# Patient Record
Sex: FEMALE | Race: WHITE | Employment: UNEMPLOYED | URBAN - METROPOLITAN AREA
[De-identification: names, ages, dates, MRNs, and addresses within clinical notes are randomized per-mention and may not be internally consistent; named-entity substitution may affect disease eponyms.]

---

## 2018-05-15 ENCOUNTER — ED HISTORICAL/CONVERTED ENCOUNTER (OUTPATIENT)
Dept: OTHER | Age: 18
End: 2018-05-15

## 2020-04-28 ENCOUNTER — ED HISTORICAL/CONVERTED ENCOUNTER (OUTPATIENT)
Dept: OTHER | Age: 20
End: 2020-04-28

## 2020-05-08 LAB
AFPT, MATERNAL, EXTERNAL: NEGATIVE
ANTIBODY SCREEN, EXTERNAL: NEGATIVE
CHLAMYDIA, EXTERNAL: NEGATIVE
CYSTIC FIBROSIS, EXTERNAL: NORMAL
HBSAG, EXTERNAL: NEGATIVE
HCT, EXTERNAL: 33.7
HEPATITIS C AB,   EXT: NEGATIVE
HGB, EXTERNAL: 10.9
HIV, EXTERNAL: POSITIVE
N. GONORRHEA, EXTERNAL: NEGATIVE
PAP SMEAR, EXTERNAL: NORMAL
PLATELET CNT,   EXTERNAL: 349
RPR, EXTERNAL: NORMAL
RUBELLA, EXTERNAL: NORMAL
TYPE, ABO & RH, EXTERNAL: NORMAL
URINALYSIS, EXTERNAL: NEGATIVE

## 2020-06-10 ENCOUNTER — ED HISTORICAL/CONVERTED ENCOUNTER (OUTPATIENT)
Dept: OTHER | Age: 20
End: 2020-06-10

## 2020-06-21 ENCOUNTER — ED HISTORICAL/CONVERTED ENCOUNTER (OUTPATIENT)
Dept: OTHER | Age: 20
End: 2020-06-21

## 2020-08-04 ENCOUNTER — ROUTINE PRENATAL (OUTPATIENT)
Dept: OBGYN CLINIC | Age: 20
End: 2020-08-04
Payer: MEDICAID

## 2020-08-04 VITALS
HEIGHT: 70 IN | WEIGHT: 269 LBS | BODY MASS INDEX: 38.51 KG/M2 | HEART RATE: 112 BPM | DIASTOLIC BLOOD PRESSURE: 80 MMHG | SYSTOLIC BLOOD PRESSURE: 120 MMHG

## 2020-08-04 DIAGNOSIS — Z3A.20 20 WEEKS GESTATION OF PREGNANCY: Primary | ICD-10-CM

## 2020-08-04 DIAGNOSIS — B20 HIV INFECTION, UNSPECIFIED SYMPTOM STATUS (HCC): ICD-10-CM

## 2020-08-04 DIAGNOSIS — O09.92 HIGH-RISK PREGNANCY IN SECOND TRIMESTER: ICD-10-CM

## 2020-08-04 DIAGNOSIS — Q51.3 BICORNUATE UTERUS: ICD-10-CM

## 2020-08-04 DIAGNOSIS — D64.9 ANEMIA, UNSPECIFIED TYPE: ICD-10-CM

## 2020-08-04 DIAGNOSIS — Z98.891 HISTORY OF CESAREAN DELIVERY: ICD-10-CM

## 2020-08-04 PROCEDURE — 0502F SUBSEQUENT PRENATAL CARE: CPT | Performed by: OBSTETRICS & GYNECOLOGY

## 2020-08-04 RX ORDER — PROMETHAZINE HYDROCHLORIDE 25 MG/1
TABLET ORAL
COMMUNITY
Start: 2020-05-08 | End: 2020-12-05

## 2020-08-04 RX ORDER — LANOLIN ALCOHOL/MO/W.PET/CERES
CREAM (GRAM) TOPICAL
COMMUNITY
Start: 2020-06-17 | End: 2020-12-05

## 2020-08-04 NOTE — PROGRESS NOTES
Appt with ID this month where they will discuss HAART. Following with MFM. No issues. +FM. Denies LOF/VB/CTXs.

## 2020-08-04 NOTE — PROGRESS NOTES
05/08/2020 7 wks 4 days aakinsanya1 u/s Present Other (see comments) 0cm / 0% / -3 112/68 sitting 263.4 lbs With clothes none none negative 3+ neg Negative 3+ 6.5 1.005  Comments: New Ob visit. See note. Ob labs. Given [id number] flier to inquire abt coverage. Threatened miscarriage due to VB.    05/15/2020 8 wks 4 days aakinsanya1 140/78 sitting   124/78 sitting 261.4 lbs With clothes   Comments: Acute visit. See note. Discussed HIV pos results    06/09/2020 12 wks 1 days aakinsanya1 160 by u/s Present Yes Other (see comments) 0cm / 0% / -3 143/71 sitting   143/75 sitting 269.6 lbs With clothes none none negative neg neg Negative trace 5.5 1.03  Comments: Elevated BP w/o dx of HTN. Will monitor. If elevated at next visit, will dx with [facility]. +FM. Denies LOF/VB. Has lower abd cramping with heavy lifting/omvement. Staying hydrated. SVE closed. Urine dip neg for UTI. Took Metronidazole for Trichomonas treatment. She has had intercourse since treatment but states it was protected. Her partner has been treated. Trich BENJA today. Has ID appt for HIV next week. There were no earlier appts available. MFM referral placed. MatT21 covered by insurance. 07/06/2020 16 wks aakinsanya1 Breech 141 by u/s Present Yes Other (see comments) 133/74 sitting 271.6 lbs With clothes none none negative trace neg Negative trace 6.0 1.015  Comments: She saw Dr Guillermo Connolyl on 7/2/2020. She has to get labs done and they will then prescribe her HAART. +FM. Denies LOF/VB/CTXs. Has next MFM appt 7/23/2020. She was treated for a UTI in the hospital in June. Urine dip neg for UTI in office. AFP to be ordered when she returns to clinic as lab is now closed. Needs CMV/Toxo labs if they were not ordered by ID.

## 2020-08-10 PROBLEM — Z98.891 HISTORY OF CESAREAN DELIVERY: Status: ACTIVE | Noted: 2020-08-10

## 2020-08-10 PROBLEM — D64.9 ANEMIA: Status: ACTIVE | Noted: 2020-08-10

## 2020-08-10 PROBLEM — Q51.3 BICORNUATE UTERUS: Status: ACTIVE | Noted: 2020-08-10

## 2020-08-20 ENCOUNTER — TELEPHONE (OUTPATIENT)
Dept: OBGYN CLINIC | Age: 20
End: 2020-08-20

## 2020-08-20 NOTE — TELEPHONE ENCOUNTER
Ambrosio Bey I have not responded. I tried calling your number, but calling restrictions will not let call go through to you. Can you please give me a call back at [phone] if you are still having symptoms. Otherwise, we will see you at your next  appointment.

## 2020-08-20 NOTE — TELEPHONE ENCOUNTER
----- Message from Jan Stern sent at 8/14/2020  2:22 PM EDT -----  Regarding: FW: Prescription Question  Contact: [phone]    ----- Message -----  From: Krystal Thompson  Sent: 8/13/2020   6:43 PM EDT  To: Itzel Markham Nurse  Subject: Prescription Question                            Im showing slight signs of uti. I feel discomfort throughout the night as well as when i urinate. Can you prescribe me an antibiotic? I would set an appointment but i currently dont have a  for my 1yo .

## 2020-09-08 ENCOUNTER — ROUTINE PRENATAL (OUTPATIENT)
Dept: OBGYN CLINIC | Age: 20
End: 2020-09-08
Payer: MEDICAID

## 2020-09-08 VITALS
SYSTOLIC BLOOD PRESSURE: 131 MMHG | BODY MASS INDEX: 39.8 KG/M2 | DIASTOLIC BLOOD PRESSURE: 71 MMHG | HEIGHT: 70 IN | WEIGHT: 278 LBS | HEART RATE: 91 BPM

## 2020-09-08 DIAGNOSIS — Z3A.25 25 WEEKS GESTATION OF PREGNANCY: ICD-10-CM

## 2020-09-08 DIAGNOSIS — O09.92 HIGH-RISK PREGNANCY IN SECOND TRIMESTER: ICD-10-CM

## 2020-09-08 DIAGNOSIS — B20 HIV INFECTION, UNSPECIFIED SYMPTOM STATUS (HCC): Primary | ICD-10-CM

## 2020-09-08 LAB
BILIRUB UR QL STRIP: NEGATIVE
GLUCOSE UR-MCNC: NEGATIVE MG/DL
KETONES P FAST UR STRIP-MCNC: NEGATIVE MG/DL
PH UR STRIP: 6 [PH] (ref 4.6–8)
PROT UR QL STRIP: NEGATIVE
SP GR UR STRIP: 1.02 (ref 1–1.03)
UA UROBILINOGEN AMB POC: NORMAL (ref 0.2–1)
URINALYSIS CLARITY POC: NORMAL
URINALYSIS COLOR POC: NORMAL
URINE BLOOD POC: NEGATIVE
URINE LEUKOCYTES POC: NORMAL
URINE NITRITES POC: NEGATIVE

## 2020-09-08 PROCEDURE — 81003 URINALYSIS AUTO W/O SCOPE: CPT | Performed by: OBSTETRICS & GYNECOLOGY

## 2020-09-08 PROCEDURE — 0502F SUBSEQUENT PRENATAL CARE: CPT | Performed by: OBSTETRICS & GYNECOLOGY
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
## 2020-09-09 LAB
BASOPHILS # BLD AUTO: 0 X10E3/UL (ref 0–0.2)
BASOPHILS NFR BLD AUTO: 0 %
EOSINOPHIL # BLD AUTO: 0.1 X10E3/UL (ref 0–0.4)
EOSINOPHIL NFR BLD AUTO: 2 %
ERYTHROCYTE [DISTWIDTH] IN BLOOD BY AUTOMATED COUNT: 14.5 % (ref 11.7–15.4)
GLUCOSE 1H P 50 G GLC PO SERPL-MCNC: 124 MG/DL (ref 65–139)
HCT VFR BLD AUTO: 30.1 % (ref 34–46.6)
HGB BLD-MCNC: 9.9 G/DL (ref 11.1–15.9)
IMM GRANULOCYTES # BLD AUTO: 0.1 X10E3/UL (ref 0–0.1)
IMM GRANULOCYTES NFR BLD AUTO: 1 %
LYMPHOCYTES # BLD AUTO: 1.6 X10E3/UL (ref 0.7–3.1)
LYMPHOCYTES NFR BLD AUTO: 20 %
MCH RBC QN AUTO: 26.7 PG (ref 26.6–33)
MCHC RBC AUTO-ENTMCNC: 32.9 G/DL (ref 31.5–35.7)
MCV RBC AUTO: 81 FL (ref 79–97)
MONOCYTES # BLD AUTO: 0.5 X10E3/UL (ref 0.1–0.9)
MONOCYTES NFR BLD AUTO: 6 %
NEUTROPHILS # BLD AUTO: 5.9 X10E3/UL (ref 1.4–7)
NEUTROPHILS NFR BLD AUTO: 71 %
PLATELET # BLD AUTO: 293 X10E3/UL (ref 150–450)
RBC # BLD AUTO: 3.71 X10E6/UL (ref 3.77–5.28)
WBC # BLD AUTO: 8.2 X10E3/UL (ref 3.4–10.8)

## 2020-09-10 LAB
BASOPHILS # BLD AUTO: 0 X10E3/UL (ref 0–0.2)
BASOPHILS NFR BLD AUTO: 0 %
CD3+CD4+ CELLS # BLD: 583 /UL (ref 359–1519)
CD3+CD4+ CELLS NFR BLD: 32.4 % (ref 30.8–58.5)
CMV IGG SERPL IA-ACNC: 2.4 U/ML (ref 0–0.59)
CMV IGM SERPL IA-ACNC: <30 AU/ML (ref 0–29.9)
EOSINOPHIL # BLD AUTO: 0.1 X10E3/UL (ref 0–0.4)
EOSINOPHIL NFR BLD AUTO: 2 %
ERYTHROCYTE [DISTWIDTH] IN BLOOD BY AUTOMATED COUNT: 14.9 % (ref 11.7–15.4)
GAMMA INTERFERON BACKGROUND BLD IA-ACNC: 0.02 IU/ML
HCT VFR BLD AUTO: 31.4 % (ref 34–46.6)
HGB BLD-MCNC: 10.2 G/DL (ref 11.1–15.9)
HIV GENOSURE, 551656: NORMAL
HIV RT+PR MUT DET ISLT: NORMAL
HIV1 RNA # SERPL NAA+PROBE: NORMAL COPIES/ML
HIV1 RNA SERPL NAA+PROBE-LOG#: 4.2 LOG10COPY/ML
HLA-B*57:01 SPEC QL: NEGATIVE
IMM GRANULOCYTES # BLD AUTO: 0.1 X10E3/UL (ref 0–0.1)
IMM GRANULOCYTES NFR BLD AUTO: 1 %
LYMPHOCYTES # BLD AUTO: 1.8 X10E3/UL (ref 0.7–3.1)
LYMPHOCYTES NFR BLD AUTO: 23 %
M TB IFN-G BLD-IMP: NEGATIVE
M TB IFN-G CD4+ BCKGRND COR BLD-ACNC: 0.02 IU/ML
MCH RBC QN AUTO: 26.4 PG (ref 26.6–33)
MCHC RBC AUTO-ENTMCNC: 32.5 G/DL (ref 31.5–35.7)
MCV RBC AUTO: 81 FL (ref 79–97)
MITOGEN IGNF BLD-ACNC: 0.58 IU/ML
MONOCYTES # BLD AUTO: 0.6 X10E3/UL (ref 0.1–0.9)
MONOCYTES NFR BLD AUTO: 7 %
NEUTROPHILS # BLD AUTO: 5.4 X10E3/UL (ref 1.4–7)
NEUTROPHILS NFR BLD AUTO: 67 %
PLATELET # BLD AUTO: 292 X10E3/UL (ref 150–450)
QUANTIFERON INCUBATION, QF1T: NORMAL
QUANTIFERON TB2 AG: 0.02 IU/ML
RBC # BLD AUTO: 3.86 X10E6/UL (ref 3.77–5.28)
SERVICE CMNT-IMP: NORMAL
WBC # BLD AUTO: 8 X10E3/UL (ref 3.4–10.8)

## 2020-09-15 ENCOUNTER — OFFICE VISIT (OUTPATIENT)
Dept: INFECTIOUS DISEASES | Age: 20
End: 2020-09-15
Payer: MEDICAID

## 2020-09-15 VITALS
RESPIRATION RATE: 16 BRPM | BODY MASS INDEX: 39.94 KG/M2 | DIASTOLIC BLOOD PRESSURE: 78 MMHG | SYSTOLIC BLOOD PRESSURE: 140 MMHG | TEMPERATURE: 98.3 F | OXYGEN SATURATION: 99 % | WEIGHT: 279 LBS | HEIGHT: 70 IN | HEART RATE: 104 BPM

## 2020-09-15 DIAGNOSIS — Z3A.25 25 WEEKS GESTATION OF PREGNANCY: ICD-10-CM

## 2020-09-15 DIAGNOSIS — Z21 ASYMPTOMATIC HIV INFECTION (HCC): Primary | ICD-10-CM

## 2020-09-15 PROCEDURE — 99214 OFFICE O/P EST MOD 30 MIN: CPT | Performed by: INTERNAL MEDICINE

## 2020-09-15 RX ORDER — PRENATAL VIT 96/IRON FUM/FOLIC 27MG-0.8MG
TABLET ORAL
COMMUNITY
Start: 2020-08-06

## 2020-09-15 RX ORDER — EMTRICITABINE AND TENOFOVIR ALAFENAMIDE 200; 25 MG/1; MG/1
1 TABLET ORAL DAILY
Qty: 30 TAB | Refills: 3 | Status: SHIPPED | OUTPATIENT
Start: 2020-09-15 | End: 2020-12-05

## 2020-09-15 NOTE — PROGRESS NOTES
Subjective  Abebe Cheeks    HPI  22 y.o WF presenting for a f/u of HIV infection diagnosed while pregnant. She was last seen in 07/2020 during which lab work was ordered but not done due to logistic issues per pt. She finally  had blood work done on 08/31/20 which showed a baseline T cell count of 583 (32.4%) and 15,700 copies/ml. Genosure did not show any evidence of transmitted resistance and she tested neg for HLA 5701B. Her CMV IgG was positive but she doesnt exhibit any symptoms of CMV disease. She is to 25 wks of gestation and her pregnancy has been progressing w/o complications. She denies acute complaints on assessment today and is interested in being started on HIV meds ASAP. ROS  Review of Systems   Constitutional: Negative. Respiratory: Negative. Cardiovascular: Negative. Gastrointestinal: Negative. Genitourinary: Negative. Musculoskeletal: Negative. Skin: Negative. Objective  Physical Exam  Constitutional:       Appearance: Normal appearance. Cardiovascular:      Rate and Rhythm: Normal rate and regular rhythm. Pulmonary:      Effort: Pulmonary effort is normal.      Breath sounds: Normal breath sounds. Abdominal:      General: Abdomen is flat. Palpations: Abdomen is soft. Comments: Obese, distended, due to pregnancy    Musculoskeletal: Normal range of motion. Neurological:      Mental Status: She is alert. Assessment & Plan  Diagnoses and all orders for this visit:    1. [facility]  -     emtricitabine-tenofovir alafen (Descovy) tablet; Take 1 Tab by mouth daily. -     raltegravir (ISENTRESS) 400 mg tablet; Take 1 Tab by mouth two (2) times a day.   -     HIV-1 RNA QT BY PCR; Future  -     LYMPHOCYTES, CD4 PERCENT AND ABSOLUTE; Future  - Initiate Descovy and isentress for treatment of HIV infection, will repeat HIV RNA and T cell count after 4 wk of tx  - Pt was counseled on compliance     2. 25 weeks gestation of pregnancy       - Being closely followed by Ob/Gyn

## 2020-09-16 ENCOUNTER — TELEPHONE (OUTPATIENT)
Dept: INFECTIOUS DISEASES | Age: 20
End: 2020-09-16

## 2020-09-16 NOTE — TELEPHONE ENCOUNTER
Phone call to [facility] re: [employer]apter[employer]. Spoke with Rashmi. I did not see the results in [employer] but she found the results and she will fax them to us.

## 2020-09-17 ENCOUNTER — TELEPHONE (OUTPATIENT)
Dept: INFECTIOUS DISEASES | Age: 20
End: 2020-09-17

## 2020-09-17 NOTE — TELEPHONE ENCOUNTER
Phone call from the patient stating that the new HIV medication Descovey is making her very ill. She is very nauseated and her stomach is hurting. She wants Dr Doris Hall to be aware that she may have to switch medications.

## 2020-09-30 ENCOUNTER — ROUTINE PRENATAL (OUTPATIENT)
Dept: OBGYN CLINIC | Age: 20
End: 2020-09-30
Payer: MEDICAID

## 2020-09-30 VITALS
HEIGHT: 70 IN | HEART RATE: 89 BPM | SYSTOLIC BLOOD PRESSURE: 134 MMHG | WEIGHT: 283 LBS | BODY MASS INDEX: 40.52 KG/M2 | DIASTOLIC BLOOD PRESSURE: 76 MMHG

## 2020-09-30 DIAGNOSIS — D64.9 ANEMIA, UNSPECIFIED TYPE: ICD-10-CM

## 2020-09-30 DIAGNOSIS — Z98.891 HISTORY OF CESAREAN DELIVERY: ICD-10-CM

## 2020-09-30 DIAGNOSIS — Z21 ASYMPTOMATIC HIV INFECTION (HCC): ICD-10-CM

## 2020-09-30 DIAGNOSIS — O09.92 HIGH-RISK PREGNANCY IN SECOND TRIMESTER: ICD-10-CM

## 2020-09-30 DIAGNOSIS — Z34.92 PRENATAL CARE IN SECOND TRIMESTER: Primary | ICD-10-CM

## 2020-09-30 DIAGNOSIS — Z3A.28 28 WEEKS GESTATION OF PREGNANCY: ICD-10-CM

## 2020-09-30 PROCEDURE — 0502F SUBSEQUENT PRENATAL CARE: CPT | Performed by: OBSTETRICS & GYNECOLOGY

## 2020-09-30 NOTE — PROGRESS NOTES
Patient states she is doing well. She is tolerating her HIV medications. Positive fetal movement. Denies leaking of fluid, vaginal bleeding, or contractions. Taking iron with her prenatal vitamins. Tdap Rx sent to pharmacy.

## 2020-10-01 ENCOUNTER — HOSPITAL ENCOUNTER (EMERGENCY)
Age: 20
Discharge: STILL ADMITTED OR ADMINISTRATIVE | End: 2020-10-01

## 2020-10-01 ENCOUNTER — HOSPITAL ENCOUNTER (OUTPATIENT)
Age: 20
Discharge: HOME | End: 2020-10-01
Attending: OBSTETRICS & GYNECOLOGY | Admitting: OBSTETRICS & GYNECOLOGY
Payer: MEDICAID

## 2020-10-01 VITALS
SYSTOLIC BLOOD PRESSURE: 146 MMHG | HEIGHT: 70 IN | RESPIRATION RATE: 18 BRPM | DIASTOLIC BLOOD PRESSURE: 88 MMHG | HEART RATE: 99 BPM | WEIGHT: 283 LBS | BODY MASS INDEX: 40.52 KG/M2 | TEMPERATURE: 99.2 F

## 2020-10-01 VITALS
SYSTOLIC BLOOD PRESSURE: 146 MMHG | HEART RATE: 99 BPM | DIASTOLIC BLOOD PRESSURE: 88 MMHG | WEIGHT: 283 LBS | BODY MASS INDEX: 40.52 KG/M2 | HEIGHT: 70 IN | RESPIRATION RATE: 18 BRPM | TEMPERATURE: 99.2 F

## 2020-10-01 DIAGNOSIS — Z03.71 SUSPECTED RUPTURE OF MEMBRANES NOT FOUND FOR NORMAL FIRST PREGNANCY: Primary | ICD-10-CM

## 2020-10-01 DIAGNOSIS — Z98.891 HISTORY OF CESAREAN DELIVERY: ICD-10-CM

## 2020-10-01 LAB
APPEARANCE UR: ABNORMAL
BACTERIA URNS QL MICRO: ABNORMAL /HPF
BASOPHILS # BLD: 0 K/UL (ref 0–0.1)
BASOPHILS NFR BLD: 0 % (ref 0–1)
BILIRUB UR QL: NEGATIVE
COLOR UR: ABNORMAL
DIFFERENTIAL METHOD BLD: ABNORMAL
EOSINOPHIL # BLD: 0.1 K/UL (ref 0–0.4)
EOSINOPHIL NFR BLD: 1 % (ref 0–7)
ERYTHROCYTE [DISTWIDTH] IN BLOOD BY AUTOMATED COUNT: 14.9 % (ref 11.5–14.5)
GLUCOSE UR STRIP.AUTO-MCNC: NEGATIVE MG/DL
HCT VFR BLD AUTO: 28.9 % (ref 35–47)
HGB BLD-MCNC: 9.6 % (ref 11.5–16)
HGB UR QL STRIP: NEGATIVE
IMM GRANULOCYTES # BLD AUTO: 0 K/UL (ref 0–0.04)
IMM GRANULOCYTES NFR BLD AUTO: 0 % (ref 0–0.5)
KETONES UR QL STRIP.AUTO: NEGATIVE MG/DL
LEUKOCYTE ESTERASE UR QL STRIP.AUTO: ABNORMAL
LYMPHOCYTES # BLD: 1.5 K/UL (ref 0.8–3.5)
LYMPHOCYTES NFR BLD: 22 % (ref 12–49)
MCH RBC QN AUTO: 26.4 PG (ref 26–34)
MCHC RBC AUTO-ENTMCNC: 33.2 G/DL (ref 30–36.5)
MCV RBC AUTO: 79.6 FL (ref 80–99)
MONOCYTES # BLD: 0.6 K/UL (ref 0–1)
MONOCYTES NFR BLD: 8 % (ref 5–13)
NEUTS SEG # BLD: 4.9 K/UL (ref 1.8–8)
NEUTS SEG NFR BLD: 69 % (ref 32–75)
NITRITE UR QL STRIP.AUTO: NEGATIVE
PH UR STRIP: 6 [PH] (ref 5–8)
PLATELET # BLD AUTO: 287 K/UL (ref 150–400)
PMV BLD AUTO: 9.8 FL (ref 8.9–12.9)
PROT UR STRIP-MCNC: NEGATIVE MG/DL
RBC # BLD AUTO: 3.63 M/UL (ref 3.8–5.2)
RBC #/AREA URNS HPF: ABNORMAL /HPF (ref 0–5)
SP GR UR REFRACTOMETRY: 1.01 (ref 1–1.03)
SURFACTANT/ALB AMN: NEGATIVE MG/G
UA: UC IF INDICATED,UAUC: ABNORMAL
UROBILINOGEN UR QL STRIP.AUTO: 0.1 EU/DL (ref 0.1–1)
WBC # BLD AUTO: 7 K/UL (ref 3.6–11)
WBC URNS QL MICRO: ABNORMAL /HPF (ref 0–4)

## 2020-10-01 PROCEDURE — 85025 COMPLETE CBC W/AUTO DIFF WBC: CPT

## 2020-10-01 PROCEDURE — 99285 EMERGENCY DEPT VISIT HI MDM: CPT

## 2020-10-01 PROCEDURE — 36415 COLL VENOUS BLD VENIPUNCTURE: CPT

## 2020-10-01 PROCEDURE — 81001 URINALYSIS AUTO W/SCOPE: CPT

## 2020-10-01 PROCEDURE — 84112 EVAL AMNIOTIC FLUID PROTEIN: CPT

## 2020-10-01 PROCEDURE — 87070 CULTURE OTHR SPECIMN AEROBIC: CPT

## 2020-10-01 PROCEDURE — 99213 OFFICE O/P EST LOW 20 MIN: CPT | Performed by: OBSTETRICS & GYNECOLOGY

## 2020-10-01 RX ORDER — SODIUM CHLORIDE, SODIUM LACTATE, POTASSIUM CHLORIDE, CALCIUM CHLORIDE 600; 310; 30; 20 MG/100ML; MG/100ML; MG/100ML; MG/100ML
125 INJECTION, SOLUTION INTRAVENOUS CONTINUOUS
Status: DISCONTINUED | OUTPATIENT
Start: 2020-10-01 | End: 2020-10-01

## 2020-10-01 NOTE — PROGRESS NOTES
OB PROGRESS NOTE /OUTPATIENT NOTE    DATE OF SERVICE:1 OCT 2020    CHIEF COMPLAINT: Suspected rupture of membranes    PROBLEM:  Pregnancy at 28-3/7-week gestation  HIV positive  History of  section  History of bicornuate uterus    SUBJECTIVE: Patient presented to L&D with complaints of watery discharge. OBJECTIVE:    VITALS:  Visit Vitals  BP (!) 146/88   Pulse 99   Temp 99.2 °F (37.3 °C)   Resp 18   Ht 5' 10\" (1.778 m)   Wt 128.4 kg (283 lb)   BMI 40.61 kg/m²      HEART: Regular rhythm, no murmur  LUNGS: Clear to auscultation at both fields  ABDOMEN: Gravid, fetal heart tones present  PELVIC: Cervix: Closed, effacement: Poor    LABS:  Recent Results (from the past 24 hour(s))   RUPTURE OF FETAL MEMBRANE    Collection Time: 10/01/20 12:00 PM   Result Value Ref Range    Rupture of fetal membrane Negative Negative     CBC WITH AUTOMATED DIFF    Collection Time: 10/01/20 12:00 PM   Result Value Ref Range    WBC 7.0 3.6 - 11.0 K/uL    RBC 3.63 (L) 3.80 - 5.20 M/uL    HGB 9.6 (L) 11.5 - 16.0 %    HCT 28.9 (L) 35.0 - 47.0 %    MCV 79.6 (L) 80.0 - 99.0 FL    MCH 26.4 26.0 - 34.0 PG    MCHC 33.2 30.0 - 36.5 g/dL    RDW 14.9 (H) 11.5 - 14.5 %    PLATELET 155 093 - 955 K/uL    MPV 9.8 8.9 - 12.9 FL    NEUTROPHILS 69 32 - 75 %    LYMPHOCYTES 22 12 - 49 %    MONOCYTES 8 5 - 13 %    EOSINOPHILS 1 0 - 7 %    BASOPHILS 0 0 - 1 %    IMMATURE GRANULOCYTES 0 0.0 - 0.5 %    ABS. NEUTROPHILS 4.9 1.8 - 8.0 K/UL    ABS. LYMPHOCYTES 1.5 0.8 - 3.5 K/UL    ABS. MONOCYTES 0.6 0.0 - 1.0 K/UL    ABS. EOSINOPHILS 0.1 0.0 - 0.4 K/UL    ABS. BASOPHILS 0.0 0.0 - 0.1 K/UL    ABS. IMM.  GRANS. 0.0 0.00 - 0.04 K/UL    DF AUTOMATED     URINALYSIS W/ REFLEX CULTURE    Collection Time: 10/01/20 12:00 PM    Specimen: Urine   Result Value Ref Range    Color Yellow/Straw      Appearance Turbid (A) Clear      Specific gravity 1.012 1.003 - 1.030      pH (UA) 6.0 5.0 - 8.0      Protein Negative Negative mg/dL    Glucose Negative Negative mg/dL Ketone Negative Negative mg/dL    Bilirubin Negative Negative      Blood Negative Negative      Urobilinogen 0.1 0.1 - 1.0 EU/dL    Nitrites Negative Negative      Leukocyte Esterase Moderate (A) Negative      UA:UC IF INDICATED Culture not indicated by UA result Culture not indicated by UA result      WBC 5-10 0 - 4 /hpf    RBC 5-10 0 - 5 /hpf    Bacteria 1+ (A) Negative /hpf     NST: Reactive, no evidence of uterine contractions. ASSESSMENT: 22years old patient with pregnancy at 28-3/7-week gestation, history of bicornuate uterus, prior  section and HIV positive who presented today with complaints of watery discharge. There is no clinical evidence of rupture of membranes. ROM plus test came back negative. There is no cervical change either. PLAN: Discharge to home. Instructed to monitor fetal movements. Labor precautions discussed. Appointment to clinic.

## 2020-10-01 NOTE — DISCHARGE INSTRUCTIONS
Please keep all scheduled appointments. Please call your physician for any bleeding, leakage of fluid, decreased fetal movement or contractions lasting longer than one hour with increasing frequency and pain.

## 2020-10-01 NOTE — PROGRESS NOTES
1213 Late entry. Patient sent up from ED with complaints of possible leakage of fluid, with clear vaginal discharge. Frequency of urination starting yesterday. Patient states when she lays down \"it feels like something is coming out of me\". Dr Jose Alejandro Cavanaugh aware of patient's arrival.     1200 Late entry GBS swab and ROM + sent to lab. See flowsheet for charting    [phone] Lab results called to Dr. Jose Alejandro Cavanaugh. Awaiting CBC results    1320 Lab results called to Dr. Jose Alejandro Cavanaugh. Dr. Jose Alejandro Cavanaugh to come see patient and patient to be discharged home    1325 DR. Jose Alejandro Cavanaugh in to see patient. Discharge order received    [phone] Discharge instructions reviewed with patient by ANYA Connolly. Patient verbalized understanding.  Patient to ambulate off unit

## 2020-10-01 NOTE — PROGRESS NOTES
1145 Patient arrived to unit from ER with complaints of generalized aching, leaking vaginal fluid, and frequent urination. Patient reports feeling multiple gushes of fluid from vagina overnight. Patient reports having history of one csection due to \"abnormal uterus\". Patient is being followed by M for uterus abnormality and HIV status. Patient reports taking her HIV meds as directed. Dr. Adrian Ko informed of patient's arrival.  ROM plus ordered as well as GBS. 1154 ROM plus and GBS swab collected at this time.

## 2020-10-05 LAB
CULTURE,CULT: NORMAL
SPECIAL REQUESTS,SREQ: NORMAL

## 2020-10-14 ENCOUNTER — ROUTINE PRENATAL (OUTPATIENT)
Dept: OBGYN CLINIC | Age: 20
End: 2020-10-14
Payer: MEDICAID

## 2020-10-14 VITALS
HEART RATE: 95 BPM | DIASTOLIC BLOOD PRESSURE: 78 MMHG | SYSTOLIC BLOOD PRESSURE: 140 MMHG | HEIGHT: 70 IN | BODY MASS INDEX: 41.09 KG/M2 | WEIGHT: 287 LBS

## 2020-10-14 DIAGNOSIS — R03.0 ELEVATED BLOOD PRESSURE READING WITHOUT DIAGNOSIS OF HYPERTENSION: ICD-10-CM

## 2020-10-14 DIAGNOSIS — O09.93 HIGH-RISK PREGNANCY IN THIRD TRIMESTER: ICD-10-CM

## 2020-10-14 DIAGNOSIS — Z3A.30 30 WEEKS GESTATION OF PREGNANCY: ICD-10-CM

## 2020-10-14 DIAGNOSIS — Z21 ASYMPTOMATIC HIV INFECTION (HCC): Primary | ICD-10-CM

## 2020-10-14 DIAGNOSIS — Z34.93 PRENATAL CARE IN THIRD TRIMESTER: ICD-10-CM

## 2020-10-14 PROBLEM — Z03.71 SUSPECTED RUPTURE OF MEMBRANES NOT FOUND FOR NORMAL FIRST PREGNANCY: Status: RESOLVED | Noted: 2020-10-01 | Resolved: 2020-10-14

## 2020-10-14 PROCEDURE — 0502F SUBSEQUENT PRENATAL CARE: CPT | Performed by: OBSTETRICS & GYNECOLOGY

## 2020-10-14 RX ORDER — DOCUSATE SODIUM 100 MG/1
CAPSULE, LIQUID FILLED ORAL
COMMUNITY
Start: 2020-09-29 | End: 2020-12-05

## 2020-10-14 NOTE — PROGRESS NOTES
Patient states she was seen in the hospital on 10/1 for possible leaking of fluid. She was not ruptured. She states she is not cramping and is no longer having the leaking. Positive fetal movement. Denies vaginal bleeding. Saw MFM yesterday. Taking HIV medications well. States pharmacy stated they did not have her Tdap vaccine. Rx resent. Told her to inquire to see if insurance is covering it, if not she can go to the health department. Elevated blood pressure today and while in the hospital.  Possibly developing hypertension. States she had labs drawn for ID doctor yesterday. She will check to see which labs they were. Hold on ordering CBC and CMP until she notifies us.

## 2020-10-15 ENCOUNTER — OFFICE VISIT (OUTPATIENT)
Dept: INFECTIOUS DISEASES | Age: 20
End: 2020-10-15
Payer: MEDICAID

## 2020-10-15 VITALS
WEIGHT: 287 LBS | HEIGHT: 70 IN | DIASTOLIC BLOOD PRESSURE: 86 MMHG | BODY MASS INDEX: 41.09 KG/M2 | HEART RATE: 103 BPM | TEMPERATURE: 98 F | OXYGEN SATURATION: 99 % | SYSTOLIC BLOOD PRESSURE: 138 MMHG

## 2020-10-15 DIAGNOSIS — Z21 ASYMPTOMATIC HIV INFECTION (HCC): ICD-10-CM

## 2020-10-15 DIAGNOSIS — Z34.90 PREGNANCY, UNSPECIFIED GESTATIONAL AGE: ICD-10-CM

## 2020-10-15 DIAGNOSIS — Z21 ASYMPTOMATIC HIV INFECTION (HCC): Primary | ICD-10-CM

## 2020-10-15 LAB
BASOPHILS # BLD AUTO: 0 X10E3/UL (ref 0–0.2)
BASOPHILS NFR BLD AUTO: 0 %
CD3+CD4+ CELLS # BLD: 593 /UL (ref 359–1519)
CD3+CD4+ CELLS NFR BLD: 34.9 % (ref 30.8–58.5)
EOSINOPHIL # BLD AUTO: 0.1 X10E3/UL (ref 0–0.4)
EOSINOPHIL NFR BLD AUTO: 1 %
ERYTHROCYTE [DISTWIDTH] IN BLOOD BY AUTOMATED COUNT: 14.3 % (ref 11.7–15.4)
HCT VFR BLD AUTO: 29.8 % (ref 34–46.6)
HGB BLD-MCNC: 9.6 G/DL (ref 11.1–15.9)
HIV1 RNA # SERPL NAA+PROBE: <20 COPIES/ML
HIV1 RNA SERPL NAA+PROBE-LOG#: NORMAL LOG10COPY/ML
IMM GRANULOCYTES # BLD AUTO: 0 X10E3/UL (ref 0–0.1)
IMM GRANULOCYTES NFR BLD AUTO: 1 %
LYMPHOCYTES # BLD AUTO: 1.7 X10E3/UL (ref 0.7–3.1)
LYMPHOCYTES NFR BLD AUTO: 21 %
MCH RBC QN AUTO: 25.9 PG (ref 26.6–33)
MCHC RBC AUTO-ENTMCNC: 32.2 G/DL (ref 31.5–35.7)
MCV RBC AUTO: 80 FL (ref 79–97)
MONOCYTES # BLD AUTO: 0.6 X10E3/UL (ref 0.1–0.9)
MONOCYTES NFR BLD AUTO: 8 %
NEUTROPHILS # BLD AUTO: 5.7 X10E3/UL (ref 1.4–7)
NEUTROPHILS NFR BLD AUTO: 69 %
PLATELET # BLD AUTO: 285 X10E3/UL (ref 150–450)
RBC # BLD AUTO: 3.71 X10E6/UL (ref 3.77–5.28)
WBC # BLD AUTO: 8.1 X10E3/UL (ref 3.4–10.8)

## 2020-10-15 PROCEDURE — 99214 OFFICE O/P EST MOD 30 MIN: CPT | Performed by: INTERNAL MEDICINE

## 2020-10-15 NOTE — PROGRESS NOTES
Subjective  [place] Other    HPI   Very pleasant 22 y.o pregnant female presenting for a routine f/u of HIV infection, diagnosed while pregnant. She was started on Descovy and isentress during her last encounter in 2020. Pt reported self limited nausea and vomiting after the first couple of doses of Descovy. She admits to 100% compliance w her cART, denies missed doses or ongoing side effects. She recently had lab work (HIV RNA and T lymphocytes) done on 10/13 through [facility] but results arent available. She denies acute complaints on assessment today. Review of Systems   Constitutional: Negative. Respiratory: Negative. Cardiovascular: Negative. Gastrointestinal: Negative. Genitourinary: Negative. Musculoskeletal: Negative. Skin: Negative. Objective  Physical Exam  Constitutional:       Appearance: Normal appearance. Cardiovascular:      Rate and Rhythm: Normal rate and regular rhythm. Pulses: Normal pulses. Heart sounds: Normal heart sounds. Pulmonary:      Effort: Pulmonary effort is normal.      Breath sounds: Normal breath sounds. Abdominal:      General: Abdomen is flat. There is no distension. Palpations: Abdomen is soft. Tenderness: There is no abdominal tenderness. Comments: Abdominal distension from gravid uterus    Musculoskeletal: Normal range of motion. Neurological:      Mental Status: She is alert. Assessment & Plan  Diagnoses and all orders for this visit:    1.  Asymptomatic HIV infection ([address]      - On Descovy and Isentress x 1 month       - HIV RNA VL and T lymphocytes done earlier this wk, results are pending       - Recommend birth by  given HIV+ status and mirna-operative Zidovudine (AZT)       - Plan on transitioning to once daily HIV regimen post child birth       - Will notify Dr Jesus Bales of recent lab results once available       - Pt was counseled on compliance w HIV meds and to notify the ID clinic promptly of side effects   -     HIV-1 RNA QT BY PCR  -     LYMPHOCYTES, CD4 PERCENT AND ABSOLUTE  -     METABOLIC PANEL, COMPREHENSIVE    2.  Pregnant: Follows up w Gyn

## 2020-10-27 ENCOUNTER — ROUTINE PRENATAL (OUTPATIENT)
Dept: OBGYN CLINIC | Age: 20
End: 2020-10-27
Payer: MEDICAID

## 2020-10-27 VITALS
SYSTOLIC BLOOD PRESSURE: 134 MMHG | DIASTOLIC BLOOD PRESSURE: 82 MMHG | HEIGHT: 70 IN | WEIGHT: 290 LBS | HEART RATE: 81 BPM | BODY MASS INDEX: 41.52 KG/M2

## 2020-10-27 DIAGNOSIS — O09.93 HIGH-RISK PREGNANCY IN THIRD TRIMESTER: ICD-10-CM

## 2020-10-27 DIAGNOSIS — Z3A.32 32 WEEKS GESTATION OF PREGNANCY: ICD-10-CM

## 2020-10-27 DIAGNOSIS — O13.3 GESTATIONAL HYPERTENSION, THIRD TRIMESTER: ICD-10-CM

## 2020-10-27 DIAGNOSIS — Z34.93 PRENATAL CARE IN THIRD TRIMESTER: Primary | ICD-10-CM

## 2020-10-27 DIAGNOSIS — D64.9 ANEMIA, UNSPECIFIED TYPE: ICD-10-CM

## 2020-10-27 DIAGNOSIS — Z21 ASYMPTOMATIC HIV INFECTION (HCC): ICD-10-CM

## 2020-10-27 PROCEDURE — 0502F SUBSEQUENT PRENATAL CARE: CPT | Performed by: OBSTETRICS & GYNECOLOGY

## 2020-10-27 NOTE — PROGRESS NOTES
Repeat blood pressure within normal limits. Likely gestational hypertension. We will continue to monitor. Positive fetal movement. No other complaints. Status post Tdap vaccine. Encouraged to obtain flu vaccine. CBC in the hospital which showed anemia and normal platelets. Will order CMP. Urine protein creatinine results not back. Patient to provide another urine sample. She is taking iron for anemia.

## 2020-10-28 LAB
ALBUMIN SERPL-MCNC: 3.6 G/DL (ref 3.9–5)
ALBUMIN/GLOB SERPL: 1.2 {RATIO} (ref 1.2–2.2)
ALP SERPL-CCNC: 81 IU/L (ref 39–117)
ALT SERPL-CCNC: 8 IU/L (ref 0–32)
AST SERPL-CCNC: 9 IU/L (ref 0–40)
BILIRUB SERPL-MCNC: 0.4 MG/DL (ref 0–1.2)
BUN SERPL-MCNC: 6 MG/DL (ref 6–20)
BUN/CREAT SERPL: 10 (ref 9–23)
CALCIUM SERPL-MCNC: 9.2 MG/DL (ref 8.7–10.2)
CHLORIDE SERPL-SCNC: 101 MMOL/L (ref 96–106)
CO2 SERPL-SCNC: 23 MMOL/L (ref 20–29)
CREAT SERPL-MCNC: 0.61 MG/DL (ref 0.57–1)
CREAT UR-MCNC: 103.3 MG/DL
GLOBULIN SER CALC-MCNC: 3.1 G/DL (ref 1.5–4.5)
GLUCOSE SERPL-MCNC: 77 MG/DL (ref 65–99)
POTASSIUM SERPL-SCNC: 4.3 MMOL/L (ref 3.5–5.2)
PROT SERPL-MCNC: 6.7 G/DL (ref 6–8.5)
PROT UR-MCNC: 10.1 MG/DL
PROT/CREAT UR: 98 MG/G CREAT (ref 0–200)
SODIUM SERPL-SCNC: 135 MMOL/L (ref 134–144)

## 2020-11-11 ENCOUNTER — ROUTINE PRENATAL (OUTPATIENT)
Dept: OBGYN CLINIC | Age: 20
End: 2020-11-11
Payer: MEDICAID

## 2020-11-11 VITALS
HEART RATE: 92 BPM | BODY MASS INDEX: 41.66 KG/M2 | WEIGHT: 291 LBS | SYSTOLIC BLOOD PRESSURE: 139 MMHG | DIASTOLIC BLOOD PRESSURE: 83 MMHG | HEIGHT: 70 IN

## 2020-11-11 DIAGNOSIS — Z3A.34 34 WEEKS GESTATION OF PREGNANCY: ICD-10-CM

## 2020-11-11 DIAGNOSIS — O26.899 CRAMPING AFFECTING PREGNANCY, ANTEPARTUM: Primary | ICD-10-CM

## 2020-11-11 DIAGNOSIS — Z21 ASYMPTOMATIC HIV INFECTION (HCC): ICD-10-CM

## 2020-11-11 DIAGNOSIS — Z34.93 PRENATAL CARE IN THIRD TRIMESTER: ICD-10-CM

## 2020-11-11 DIAGNOSIS — D64.9 ANEMIA, UNSPECIFIED TYPE: ICD-10-CM

## 2020-11-11 DIAGNOSIS — O09.93 HIGH-RISK PREGNANCY IN THIRD TRIMESTER: ICD-10-CM

## 2020-11-11 DIAGNOSIS — R10.9 CRAMPING AFFECTING PREGNANCY, ANTEPARTUM: Primary | ICD-10-CM

## 2020-11-11 DIAGNOSIS — Z98.891 HISTORY OF CESAREAN DELIVERY: ICD-10-CM

## 2020-11-11 PROCEDURE — 81003 URINALYSIS AUTO W/O SCOPE: CPT | Performed by: OBSTETRICS & GYNECOLOGY

## 2020-11-11 PROCEDURE — 0502F SUBSEQUENT PRENATAL CARE: CPT | Performed by: OBSTETRICS & GYNECOLOGY

## 2020-11-11 NOTE — PROGRESS NOTES
Patient has some mild cramping. SVE closed. Urine dip negative for UTI. Denies leaking of fluid or vaginal bleeding. Positive fetal movement. Last MFM scan within normal limits. Recommend delivery at 39 weeks if viral load is undetectable. Adherent to medications for HIV. 3rd trim labs at next visit.

## 2020-11-16 ENCOUNTER — NURSE TRIAGE (OUTPATIENT)
Dept: OBGYN CLINIC | Age: 20
End: 2020-11-16

## 2020-11-16 DIAGNOSIS — R11.2 NAUSEA AND VOMITING, INTRACTABILITY OF VOMITING NOT SPECIFIED, UNSPECIFIED VOMITING TYPE: Primary | ICD-10-CM

## 2020-11-16 RX ORDER — ONDANSETRON 4 MG/1
4 TABLET, ORALLY DISINTEGRATING ORAL
Qty: 30 TAB | Refills: 1 | Status: SHIPPED | OUTPATIENT
Start: 2020-11-16 | End: 2020-12-05

## 2020-11-16 NOTE — TELEPHONE ENCOUNTER
Would like prescription sent if any to [facility] [employer] [facility] in [place] [address] and if we have problems with her phone number she has given a second number [phone].

## 2020-11-16 NOTE — TELEPHONE ENCOUNTER
Patient contacted office reports that she is having problems sleeping, having nausea and her bowel movements have changed become softer. She reports that she has no nausea medication and is taking stool softner. Advised patient that she could take benadryl at night for sleep and to stop stool softner. Please review and advise.

## 2020-11-18 ENCOUNTER — HOSPITAL ENCOUNTER (OUTPATIENT)
Age: 20
Discharge: HOME | End: 2020-11-18
Attending: EMERGENCY MEDICINE | Admitting: OBSTETRICS & GYNECOLOGY
Payer: MEDICAID

## 2020-11-18 VITALS
DIASTOLIC BLOOD PRESSURE: 66 MMHG | WEIGHT: 291 LBS | HEART RATE: 90 BPM | HEIGHT: 71 IN | SYSTOLIC BLOOD PRESSURE: 128 MMHG | BODY MASS INDEX: 40.74 KG/M2

## 2020-11-18 PROBLEM — R10.9 ABDOMINAL PAIN: Status: ACTIVE | Noted: 2020-11-18

## 2020-11-18 PROCEDURE — 99283 EMERGENCY DEPT VISIT LOW MDM: CPT

## 2020-11-18 NOTE — ED PROVIDER NOTES
EMERGENCY DEPARTMENT HISTORY AND PHYSICAL EXAM      Date: (Not on file)  Patient Name: [place]    History of Presenting Illness     Chief Complaint   Patient presents with    Laboring       History Provided By: Patient    HPI: [place], 23 y.o. female   presents to the ED with cc of labor. Patient who is at 35 weeks gestation came to emergency room complaining of abdominal discomfort that started yesterday morning. It is described as sharp that is intermittent. Patient came to the emergency room because the episode has been became more frequent. No vaginal bleed or breakage of the water. No dysuria. No fever chills. No cough. PCP: None    No current facility-administered medications on file prior to encounter. Current Outpatient Medications on File Prior to Encounter   Medication Sig Dispense Refill    ondansetron (Zofran ODT) 4 mg disintegrating tablet Take 1 Tab by mouth every eight (8) hours as needed for Nausea or Vomiting. 30 Tab 1    docusate sodium (COLACE) 100 mg capsule       prenatal MBPX20/KGHW fum/folic (prenatal vitamin) 27 mg iron- 800 mcg tab tablet       emtricitabine-tenofovir alafen (Descovy) tablet Take 1 Tab by mouth daily. 30 Tab 3    raltegravir (ISENTRESS) 400 mg tablet Take 1 Tab by mouth two (2) times a day.  60 Tab 3    Prenatal Vitamin tab       ferrous sulfate 325 mg (65 mg iron) tablet       promethazine (PHENERGAN) 25 mg tablet          Past History     Past Medical History:  Past Medical History:   Diagnosis Date    Anxiety and depression     Asthma     History of trichomonal vaginitis     [facility]) [facility]     Obesity     Sickle cell trait ([facility]     FOB neg       Past Surgical History:  Past Surgical History:   Procedure Laterality Date    HX  SECTION         Family History:  Family History   Problem Relation Age of Onset    Diabetes Father        Social History:  Social History     Tobacco Use    Smoking status: Never Smoker    Smokeless tobacco: Never Used   Substance Use Topics    Alcohol use: Not Currently    Drug use: Never       Allergies: Allergies   Allergen Reactions    Keflex [Cephalexin] Hives         Review of Systems   Review of Systems   Constitutional: Negative for chills and fever. Respiratory: Negative for shortness of breath. Cardiovascular: Negative for chest pain. Gastrointestinal: Negative for nausea. Genitourinary: Negative for vaginal bleeding. Neurological: Negative for headaches. Physical Exam   Physical Exam  Vitals signs and nursing note reviewed. Constitutional:       General: She is not in acute distress. Appearance: Normal appearance. She is well-developed. She is not ill-appearing or toxic-appearing. HENT:      Head: Normocephalic and atraumatic. Nose: No congestion. Mouth/Throat:      Mouth: Mucous membranes are moist.   Eyes:      Conjunctiva/sclera: Conjunctivae normal.   Neck:      Musculoskeletal: Neck supple. Cardiovascular:      Rate and Rhythm: Regular rhythm. Heart sounds: Normal heart sounds. Pulmonary:      Effort: Pulmonary effort is normal.      Breath sounds: Normal breath sounds. Abdominal:      General: Bowel sounds are normal.      Palpations: Abdomen is soft. Tenderness: There is no abdominal tenderness. Comments: Gravid   Musculoskeletal:         General: No deformity. Skin:     General: Skin is warm and dry. Neurological:      General: No focal deficit present. Mental Status: She is alert. Psychiatric:         Mood and Affect: Mood normal.         Diagnostic Study Results     Labs -   No results found for this or any previous visit (from the past 12 hour(s)). Radiologic Studies -   No orders to display     CT Results  (Last 48 hours)    None        CXR Results  (Last 48 hours)    None            Medical Decision Making   I am the first provider for this patient.     I reviewed the vital signs, available nursing notes, past medical history, past surgical history, family history and social history. Vital Signs-Reviewed the patient's vital signs. No data found. Records Reviewed:     Provider Notes (Medical Decision Making):       ED Course:   Initial assessment performed. The patients presenting problems have been discussed, and they are in agreement with the care plan formulated and outlined with them. I have encouraged them to ask questions as they arise throughout their visit. PROCEDURES      Disposition: Condition stable and improved  DC- Adult Discharges: All of the diagnostic tests were reviewed and questions answered. Diagnosis, care plan and treatment options were discussed. understand instructions and will follow up as directed. The patients results have been reviewed with them. They have been counseled regarding their diagnosis. The patient verbally convey understanding and agreement of the signs, symptoms, diagnosis, treatment and prognosis and additionally agrees to follow up as recommended with their PCP in 24 - 48 hours. They also agree with the care-plan and convey that all of their questions have been answered. I have also put together some discharge instructions for them that include: 1) educational information regarding their diagnosis, 2) how to care for their diagnosis at home, as well a 3) list of reasons why they would want to return to the ED prior to their follow-up appointment, should their condition change. PLAN:  1. Current Discharge Medication List        2. Follow-up Information    None       Return to ED if worse     Diagnosis     Clinical Impression: No diagnosis found. Please note that this dictation was completed with [employer], the computer voice recognition software. Quite often unanticipated grammatical, syntax, homophones, and other interpretive errors are inadvertently transcribed by the computer software.   Please disregard these errors. Please excuse any errors that have escaped final proofreading. Thank you.

## 2020-11-18 NOTE — PROGRESS NOTES
Patient to LD 9 in wheelchair from ED with complaint of abdominal cramping and pain to vagina. She reported that she has been having abdominal cramping since yesterday but was woken up out of her sleep with a sharp pain tonight. Currently rates pain at 6 on pain scale. Pt to bathroom to change into gown and give urine specimen. 6375: Dr. Deepa Syed updated about patient, SBAR done, strip reviewed with MD strip reactive, no uterine activity noted. Order received to discharge pt home. 8490: Discharge instructions reviewed with pt. To call provider if pain returns and worsens, contractions, leaking of fluid, vaginal bleeding, baby not moving as before, change in type and amount of vaginal discharge or any questions or concerns. Encouraged to drink plenty fluid to keep hydrated, to keep appointment with provider and MFM. Verbalized understanding and had no questions or concerns.     9709: Pushed off unit in wheelchair to ED for home in stable condition, pt picked up by s/o

## 2020-11-18 NOTE — DISCHARGE INSTRUCTIONS
Patient Education        Belly Pain in Pregnancy: Care Instructions  Your Care Instructions     When you're pregnant, any belly pain can be a worry. You may not want to call your doctor about every pain you have. But you don't want to miss something that is dangerous for you or your baby. Even if it feels familiar, belly pain can mean something new when you're pregnant. It's important to know when to call your doctor. It will also help to know how to care for yourself at home when your pain is not caused by anything harmful. · When belly pain is more severe or constant, see a doctor right away. · If you're sure your belly pain is a sign of labor, call your doctor. · When belly pain is brief, it's usually a normal part of pregnancy. It might be related to changes in the growing uterus. Or it could be the stretching of ligaments called round ligaments. These ligaments help support the uterus. Round ligament pain can be on either side of your belly. It can also be felt in your hips or groin. Follow-up care is a key part of your treatment and safety. Be sure to make and go to all appointments, and call your doctor if you are having problems. It's also a good idea to know your test results and keep a list of the medicines you take. How can you tell if belly pain is a sign of labor? When belly pain is caused by labor, it can feel like mild or menstrual-like cramps in your lower belly. These cramps are probably contractions. They can happen in your second or third trimester. You may also have:  · A steady, dull ache in your lower back, pelvis, or thighs. · A feeling of pressure in your pelvis or lower belly. · Changes in your vaginal discharge or a sudden release of fluid from the vagina. If you think you are in labor, call your doctor. How can you care for yourself at home? When belly pain is mild and is not a symptom of labor:  · Rest until you feel better. · Take a warm bath.   · Think about what you drink and eat:  ? Drink plenty of fluids. Choose water and other caffeine-free clear liquids until you feel better. ? Try eating small, frequent meals. If your stomach is upset, try bland, low-fat foods like plain rice, broiled chicken, toast, and yogurt. · Think about how you move if you are having brief pains from stretching of the round ligaments. ? Try gentle stretching. ? Move a little more slowly when turning in bed or getting up from a chair, so those ligaments don't stretch quickly. ? Lean forward a bit if you think you are going to cough or sneeze. When should you call for help? Call 911 anytime you think you may need emergency care. For example, call if:    · You have sudden, severe pain in your belly.     · You have severe vaginal bleeding.     · You passed out (lost consciousness).     · You have a seizure. Call your doctor now or seek immediate medical care if:    · You have new or worse belly pain or cramping.     · You have any vaginal bleeding.     · You have a fever.     · You have symptoms of preeclampsia, such as:  ? Sudden swelling of your face, hands, or feet. ? New vision problems (such as dimness, blurring, or seeing spots). ? A severe headache.     · You think that you may be in labor. This means that you've had at least 8 contractions within 1 hour or at least 4 contractions within 20 minutes, even after you change your position and drink fluids.     · You have symptoms of a urinary tract infection. These may include:  ? Pain or burning when you urinate. ? A frequent need to urinate without being able to pass much urine. ? Pain in the flank, which is just below the rib cage and above the waist on either side of the back. ? Blood in your urine. Watch closely for changes in your health, and be sure to contact your doctor if you are worried about your or your baby's health. Where can you learn more?   Go to http://www.gray.com/  Enter B275 in the search box to learn more about \"Belly Pain in Pregnancy: Care Instructions. \"  Current as of: February 11, 2020               Content Version: 12.6  © 2006-2020 [employer]. Care instructions adapted under license by [employer] (which disclaims liability or warranty for this information). If you have questions about a medical condition or this instruction, always ask your healthcare professional. [address] any warranty or liability for your use of this information. Patient Education     Patient Education        Round Ligament Pain: Care Instructions  Your Care Instructions     Round ligament pain is a common pain during pregnancy. You may feel a sharp brief pain on one or both sides of your belly. It may go down into your groin. It's usually felt for the first time during the second trimester. This pain is a normal part of pregnancy. It will go away as your pregnancy continues or after your baby is born. Your uterus is supported by two ligaments that go from the top and sides of the uterus to the bones of the pelvis. These are the round ligaments. As your uterus grows, these ligaments stretch and tighten with your movements. This may be the cause of the pain. You may find that certain activities seem to cause pain. If you can, avoid those activities. Your doctor can usually diagnose round ligament pain from your symptoms and an exam. If you have bleeding or other symptoms, your doctor may also do an imaging test, such as an ultrasound. Your doctor may suggest that you take an over-the-counter pain medicine, such as acetaminophen. Follow-up care is a key part of your treatment and safety. Be sure to make and go to all appointments, and call your doctor if you are having problems. It's also a good idea to know your test results and keep a list of the medicines you take. How can you care for yourself at home?   · If certain movements seem to trigger the pain, see if you can avoid them while you are pregnant. · Stay active. If your doctor says it's okay, try moderate exercise. Many pregnant women find that water exercise is most comfortable. Examples are swimming and water aerobics. · Ask your doctor about taking acetaminophen for pain. Be safe with medicines. Read and follow all instructions on the label. When should you call for help? Call your doctor now or seek immediate medical care if:    · You think you might be in labor.     · You have new or worse pain. Watch closely for changes in your health, and be sure to contact your doctor if you have any problems. Where can you learn more? Go to http://www.gray.com/  Enter R110 in the search box to learn more about \"Round Ligament Pain: Care Instructions. \"  Current as of: February 11, 2020               Content Version: 12.6  © 9331-7092 [employer]. Care instructions adapted under license by [employer] (which disclaims liability or warranty for this information). If you have questions about a medical condition or this instruction, always ask your healthcare professional. [place] [address] any warranty or liability for your use of this information. Counting Your Baby's Kicks: Care Instructions  Your Care Instructions     Counting your baby's kicks is one way your doctor can tell that your baby is healthy. Most women--especially in a first pregnancy--feel their baby move for the first time between 16 and 22 weeks. The movement may feel like flutters rather than kicks. Your baby may move more at certain times of the day. When you are active, you may notice less kicking than when you are resting. At your prenatal visits, your doctor will ask whether the baby is active. In your last trimester, your doctor may ask you to count the number of times you feel your baby move. Follow-up care is a key part of your treatment and safety.  Be sure to make and go to all appointments, and call your doctor if you are having problems. It's also a good idea to know your test results and keep a list of the medicines you take. How do you count fetal kicks? · A common method of checking your baby's movement is to count the number of kicks or moves you feel in 1 hour. Ten movements (such as kicks, flutters, or rolls) in 1 hour are normal. Some doctors suggest that you count in the morning until you get to 10 movements. Then you can quit for that day and start again the next day. · Pick your baby's most active time of day to count. This may be any time from morning to evening. · If you do not feel 10 movements in an hour, your baby may be sleeping. Wait for the next hour and count again. When should you call for help? Call your doctor now or seek immediate medical care if:    · You noticed that your baby has stopped moving or is moving much less than normal.   Watch closely for changes in your health, and be sure to contact your doctor if you have any problems. Where can you learn more? Go to http://www.gray.com/  Enter [id number] in the search box to learn more about \"Counting Your Baby's Kicks: Care Instructions. \"  Current as of: February 11, 2020               Content Version: 12.6  © 6049-1416 [employer]. Care instructions adapted under license by [employer] (which disclaims liability or warranty for this information). If you have questions about a medical condition or this instruction, always ask your healthcare professional. [address] any warranty or liability for your use of this information.

## 2020-11-23 VITALS
SYSTOLIC BLOOD PRESSURE: 133 MMHG | WEIGHT: 271.6 LBS | HEART RATE: 116 BPM | DIASTOLIC BLOOD PRESSURE: 74 MMHG | BODY MASS INDEX: 38.88 KG/M2 | HEIGHT: 70 IN

## 2020-11-23 RX ORDER — AMOXICILLIN AND CLAVULANATE POTASSIUM 875; 125 MG/1; MG/1
TABLET, FILM COATED ORAL EVERY 12 HOURS
COMMUNITY
End: 2020-11-24

## 2020-11-24 ENCOUNTER — ROUTINE PRENATAL (OUTPATIENT)
Dept: OBGYN CLINIC | Age: 20
End: 2020-11-24
Payer: MEDICAID

## 2020-11-24 VITALS
SYSTOLIC BLOOD PRESSURE: 154 MMHG | WEIGHT: 293 LBS | HEIGHT: 70 IN | HEART RATE: 90 BPM | DIASTOLIC BLOOD PRESSURE: 83 MMHG | BODY MASS INDEX: 41.95 KG/M2

## 2020-11-24 DIAGNOSIS — Z3A.36 36 WEEKS GESTATION OF PREGNANCY: ICD-10-CM

## 2020-11-24 DIAGNOSIS — O09.93 HIGH-RISK PREGNANCY IN THIRD TRIMESTER: ICD-10-CM

## 2020-11-24 DIAGNOSIS — Z98.891 HISTORY OF CESAREAN DELIVERY: ICD-10-CM

## 2020-11-24 DIAGNOSIS — Q51.3 BICORNUATE UTERUS: ICD-10-CM

## 2020-11-24 DIAGNOSIS — O13.3 GESTATIONAL HYPERTENSION, THIRD TRIMESTER: ICD-10-CM

## 2020-11-24 DIAGNOSIS — Z21 ASYMPTOMATIC HIV INFECTION (HCC): ICD-10-CM

## 2020-11-24 DIAGNOSIS — Z34.93 PRENATAL CARE IN THIRD TRIMESTER: Primary | ICD-10-CM

## 2020-11-24 PROCEDURE — 0502F SUBSEQUENT PRENATAL CARE: CPT | Performed by: OBSTETRICS & GYNECOLOGY

## 2020-11-27 LAB — B-HEM STREP SPEC QL CULT: NEGATIVE

## 2020-12-01 DIAGNOSIS — Z21 ASYMPTOMATIC HIV INFECTION (HCC): Primary | ICD-10-CM

## 2020-12-03 ENCOUNTER — ANESTHESIA EVENT (OUTPATIENT)
Dept: LABOR AND DELIVERY | Age: 20
DRG: 540 | End: 2020-12-03
Payer: MEDICAID

## 2020-12-03 ENCOUNTER — HOSPITAL ENCOUNTER (INPATIENT)
Age: 20
LOS: 2 days | Discharge: HOME | DRG: 540 | End: 2020-12-05
Attending: OBSTETRICS & GYNECOLOGY | Admitting: OBSTETRICS & GYNECOLOGY
Payer: MEDICAID

## 2020-12-03 ENCOUNTER — ANESTHESIA (OUTPATIENT)
Dept: LABOR AND DELIVERY | Age: 20
DRG: 540 | End: 2020-12-03
Payer: MEDICAID

## 2020-12-03 PROBLEM — R34 OLIGOURIA: Status: ACTIVE | Noted: 2020-12-03

## 2020-12-03 PROBLEM — Z3A.37 PREGNANCY WITH 37 WEEKS COMPLETED GESTATION: Status: ACTIVE | Noted: 2020-12-03

## 2020-12-03 PROBLEM — O41.03X0 OLIGOHYDRAMNIOS IN THIRD TRIMESTER: Status: ACTIVE | Noted: 2020-12-03

## 2020-12-03 LAB
ABO + RH BLD: NORMAL
APPEARANCE UR: CLEAR
BACTERIA URNS QL MICRO: NEGATIVE /HPF
BASOPHILS # BLD: 0 K/UL (ref 0–0.1)
BASOPHILS NFR BLD: 0 % (ref 0–1)
BILIRUB UR QL: NEGATIVE
BLOOD GROUP ANTIBODIES SERPL: NEGATIVE
COLOR UR: ABNORMAL
COVID-19 RAPID TEST, COVR: NOT DETECTED
DIFFERENTIAL METHOD BLD: ABNORMAL
EOSINOPHIL # BLD: 0.1 K/UL (ref 0–0.4)
EOSINOPHIL NFR BLD: 1 % (ref 0–7)
ERYTHROCYTE [DISTWIDTH] IN BLOOD BY AUTOMATED COUNT: 15.3 % (ref 11.5–14.5)
GLUCOSE UR STRIP.AUTO-MCNC: NEGATIVE MG/DL
HCT VFR BLD AUTO: 32.1 % (ref 35–47)
HGB BLD-MCNC: 10.7 G/DL (ref 11.5–16)
HGB UR QL STRIP: NEGATIVE
IMM GRANULOCYTES # BLD AUTO: 0.1 K/UL (ref 0–0.04)
IMM GRANULOCYTES NFR BLD AUTO: 1 % (ref 0–0.5)
KETONES UR QL STRIP.AUTO: NEGATIVE MG/DL
LEUKOCYTE ESTERASE UR QL STRIP.AUTO: ABNORMAL
LYMPHOCYTES # BLD: 1.6 K/UL (ref 0.8–3.5)
LYMPHOCYTES NFR BLD: 20 % (ref 12–49)
MCH RBC QN AUTO: 27.6 PG (ref 26–34)
MCHC RBC AUTO-ENTMCNC: 33.3 G/DL (ref 30–36.5)
MCV RBC AUTO: 82.7 FL (ref 80–99)
MONOCYTES # BLD: 0.5 K/UL (ref 0–1)
MONOCYTES NFR BLD: 6 % (ref 5–13)
NEUTS SEG # BLD: 5.8 K/UL (ref 1.8–8)
NEUTS SEG NFR BLD: 72 % (ref 32–75)
NITRITE UR QL STRIP.AUTO: NEGATIVE
PH UR STRIP: 6 [PH] (ref 5–8)
PLATELET # BLD AUTO: 282 K/UL (ref 150–400)
PMV BLD AUTO: 9.8 FL (ref 8.9–12.9)
PROT UR STRIP-MCNC: NEGATIVE MG/DL
RBC # BLD AUTO: 3.88 M/UL (ref 3.8–5.2)
RBC #/AREA URNS HPF: ABNORMAL /HPF (ref 0–5)
SARS-COV-2, COV2: NORMAL
SP GR UR REFRACTOMETRY: 1.01 (ref 1–1.03)
SPECIMEN EXP DATE BLD: NORMAL
SPECIMEN SOURCE: NORMAL
UROBILINOGEN UR QL STRIP.AUTO: 0.1 EU/DL (ref 0.1–1)
WBC # BLD AUTO: 8 K/UL (ref 3.6–11)
WBC URNS QL MICRO: ABNORMAL /HPF (ref 0–4)

## 2020-12-03 PROCEDURE — 76060000033 HC ANESTHESIA 1 TO 1.5 HR: Performed by: OBSTETRICS & GYNECOLOGY

## 2020-12-03 PROCEDURE — 74011250636 HC RX REV CODE- 250/636

## 2020-12-03 PROCEDURE — 85025 COMPLETE CBC W/AUTO DIFF WBC: CPT

## 2020-12-03 PROCEDURE — 76010000392 HC C SECN EA ADDL 0.5 HR: Performed by: OBSTETRICS & GYNECOLOGY

## 2020-12-03 PROCEDURE — 74011250636 HC RX REV CODE- 250/636: Performed by: ANESTHESIOLOGY

## 2020-12-03 PROCEDURE — 74011250636 HC RX REV CODE- 250/636: Performed by: NURSE ANESTHETIST, CERTIFIED REGISTERED

## 2020-12-03 PROCEDURE — 74011000258 HC RX REV CODE- 258: Performed by: OBSTETRICS & GYNECOLOGY

## 2020-12-03 PROCEDURE — 59510 CESAREAN DELIVERY: CPT | Performed by: OBSTETRICS & GYNECOLOGY

## 2020-12-03 PROCEDURE — 76010000391 HC C SECN FIRST 1 HR: Performed by: OBSTETRICS & GYNECOLOGY

## 2020-12-03 PROCEDURE — 74011250636 HC RX REV CODE- 250/636: Performed by: OBSTETRICS & GYNECOLOGY

## 2020-12-03 PROCEDURE — 87086 URINE CULTURE/COLONY COUNT: CPT

## 2020-12-03 PROCEDURE — 74011000250 HC RX REV CODE- 250: Performed by: NURSE ANESTHETIST, CERTIFIED REGISTERED

## 2020-12-03 PROCEDURE — 65410000002 HC RM PRIVATE OB

## 2020-12-03 PROCEDURE — 99285 EMERGENCY DEPT VISIT HI MDM: CPT

## 2020-12-03 PROCEDURE — 96360 HYDRATION IV INFUSION INIT: CPT

## 2020-12-03 PROCEDURE — 76060000078 HC EPIDURAL ANESTHESIA: Performed by: OBSTETRICS & GYNECOLOGY

## 2020-12-03 PROCEDURE — 81001 URINALYSIS AUTO W/SCOPE: CPT

## 2020-12-03 PROCEDURE — 86900 BLOOD TYPING SEROLOGIC ABO: CPT

## 2020-12-03 PROCEDURE — 87635 SARS-COV-2 COVID-19 AMP PRB: CPT

## 2020-12-03 PROCEDURE — 74011250637 HC RX REV CODE- 250/637

## 2020-12-03 RX ORDER — NALBUPHINE HYDROCHLORIDE 10 MG/ML
5 INJECTION, SOLUTION INTRAMUSCULAR; INTRAVENOUS; SUBCUTANEOUS
Status: DISCONTINUED | OUTPATIENT
Start: 2020-12-03 | End: 2020-12-03

## 2020-12-03 RX ORDER — KETOROLAC TROMETHAMINE 30 MG/ML
30 INJECTION, SOLUTION INTRAMUSCULAR; INTRAVENOUS
Status: DISPENSED | OUTPATIENT
Start: 2020-12-03 | End: 2020-12-04

## 2020-12-03 RX ORDER — CLINDAMYCIN PHOSPHATE 900 MG/50ML
900 INJECTION INTRAVENOUS ONCE
Status: COMPLETED | OUTPATIENT
Start: 2020-12-03 | End: 2020-12-03

## 2020-12-03 RX ORDER — OXYTOCIN/0.9 % SODIUM CHLORIDE 30/500 ML
95 PLASTIC BAG, INJECTION (ML) INTRAVENOUS AS NEEDED
Status: DISCONTINUED | OUTPATIENT
Start: 2020-12-03 | End: 2020-12-05 | Stop reason: HOSPADM

## 2020-12-03 RX ORDER — NALOXONE HYDROCHLORIDE 0.4 MG/ML
0.2 INJECTION, SOLUTION INTRAMUSCULAR; INTRAVENOUS; SUBCUTANEOUS
Status: DISCONTINUED | OUTPATIENT
Start: 2020-12-03 | End: 2020-12-03

## 2020-12-03 RX ORDER — ONDANSETRON 2 MG/ML
4 INJECTION INTRAMUSCULAR; INTRAVENOUS AS NEEDED
Status: COMPLETED | OUTPATIENT
Start: 2020-12-03 | End: 2020-12-03

## 2020-12-03 RX ORDER — SODIUM CHLORIDE, SODIUM LACTATE, POTASSIUM CHLORIDE, CALCIUM CHLORIDE 600; 310; 30; 20 MG/100ML; MG/100ML; MG/100ML; MG/100ML
INJECTION, SOLUTION INTRAVENOUS
Status: DISCONTINUED | OUTPATIENT
Start: 2020-12-03 | End: 2020-12-03 | Stop reason: HOSPADM

## 2020-12-03 RX ORDER — SODIUM CHLORIDE, SODIUM LACTATE, POTASSIUM CHLORIDE, CALCIUM CHLORIDE 600; 310; 30; 20 MG/100ML; MG/100ML; MG/100ML; MG/100ML
125 INJECTION, SOLUTION INTRAVENOUS CONTINUOUS
Status: DISCONTINUED | OUTPATIENT
Start: 2020-12-03 | End: 2020-12-03

## 2020-12-03 RX ORDER — SODIUM CHLORIDE, SODIUM LACTATE, POTASSIUM CHLORIDE, CALCIUM CHLORIDE 600; 310; 30; 20 MG/100ML; MG/100ML; MG/100ML; MG/100ML
85 INJECTION, SOLUTION INTRAVENOUS CONTINUOUS
Status: DISCONTINUED | OUTPATIENT
Start: 2020-12-03 | End: 2020-12-05 | Stop reason: HOSPADM

## 2020-12-03 RX ORDER — SODIUM CHLORIDE 0.9 % (FLUSH) 0.9 %
5-40 SYRINGE (ML) INJECTION AS NEEDED
Status: DISCONTINUED | OUTPATIENT
Start: 2020-12-03 | End: 2020-12-03

## 2020-12-03 RX ORDER — MORPHINE SULFATE 0.5 MG/ML
INJECTION, SOLUTION EPIDURAL; INTRATHECAL; INTRAVENOUS AS NEEDED
Status: DISCONTINUED | OUTPATIENT
Start: 2020-12-03 | End: 2020-12-03 | Stop reason: HOSPADM

## 2020-12-03 RX ORDER — DEXAMETHASONE SODIUM PHOSPHATE 100 MG/10ML
INJECTION INTRAMUSCULAR; INTRAVENOUS AS NEEDED
Status: DISCONTINUED | OUTPATIENT
Start: 2020-12-03 | End: 2020-12-03 | Stop reason: HOSPADM

## 2020-12-03 RX ORDER — BUPIVACAINE HYDROCHLORIDE 7.5 MG/ML
INJECTION, SOLUTION EPIDURAL; RETROBULBAR AS NEEDED
Status: DISCONTINUED | OUTPATIENT
Start: 2020-12-03 | End: 2020-12-03 | Stop reason: HOSPADM

## 2020-12-03 RX ORDER — IBUPROFEN 800 MG/1
800 TABLET ORAL EVERY 8 HOURS
Status: DISCONTINUED | OUTPATIENT
Start: 2020-12-04 | End: 2020-12-05 | Stop reason: HOSPADM

## 2020-12-03 RX ORDER — SODIUM CHLORIDE 0.9 % (FLUSH) 0.9 %
5-40 SYRINGE (ML) INJECTION EVERY 8 HOURS
Status: DISCONTINUED | OUTPATIENT
Start: 2020-12-03 | End: 2020-12-05 | Stop reason: HOSPADM

## 2020-12-03 RX ORDER — CLINDAMYCIN PHOSPHATE 900 MG/50ML
900 INJECTION INTRAVENOUS EVERY 8 HOURS
Status: DISCONTINUED | OUTPATIENT
Start: 2020-12-04 | End: 2020-12-04

## 2020-12-03 RX ORDER — SODIUM CHLORIDE 0.9 % (FLUSH) 0.9 %
5-40 SYRINGE (ML) INJECTION EVERY 8 HOURS
Status: DISCONTINUED | OUTPATIENT
Start: 2020-12-03 | End: 2020-12-03

## 2020-12-03 RX ORDER — SODIUM CHLORIDE, SODIUM LACTATE, POTASSIUM CHLORIDE, CALCIUM CHLORIDE 600; 310; 30; 20 MG/100ML; MG/100ML; MG/100ML; MG/100ML
1000 INJECTION, SOLUTION INTRAVENOUS CONTINUOUS
Status: DISCONTINUED | OUTPATIENT
Start: 2020-12-03 | End: 2020-12-03

## 2020-12-03 RX ORDER — OXYCODONE AND ACETAMINOPHEN 5; 325 MG/1; MG/1
2 TABLET ORAL
Status: DISCONTINUED | OUTPATIENT
Start: 2020-12-03 | End: 2020-12-05 | Stop reason: HOSPADM

## 2020-12-03 RX ORDER — OXYTOCIN/RINGER'S LACTATE 30/500 ML
10 PLASTIC BAG, INJECTION (ML) INTRAVENOUS AS NEEDED
Status: DISCONTINUED | OUTPATIENT
Start: 2020-12-03 | End: 2020-12-05 | Stop reason: HOSPADM

## 2020-12-03 RX ORDER — ENOXAPARIN SODIUM 100 MG/ML
40 INJECTION SUBCUTANEOUS EVERY 24 HOURS
Status: DISCONTINUED | OUTPATIENT
Start: 2020-12-03 | End: 2020-12-05 | Stop reason: HOSPADM

## 2020-12-03 RX ORDER — TRISODIUM CITRATE DIHYDRATE AND CITRIC ACID MONOHYDRATE 500; 334 MG/5ML; MG/5ML
SOLUTION ORAL
Status: COMPLETED
Start: 2020-12-03 | End: 2020-12-03

## 2020-12-03 RX ORDER — SIMETHICONE 80 MG
80 TABLET,CHEWABLE ORAL AS NEEDED
Status: DISCONTINUED | OUTPATIENT
Start: 2020-12-03 | End: 2020-12-05 | Stop reason: HOSPADM

## 2020-12-03 RX ORDER — DIPHENHYDRAMINE HYDROCHLORIDE 50 MG/ML
25 INJECTION, SOLUTION INTRAMUSCULAR; INTRAVENOUS
Status: DISCONTINUED | OUTPATIENT
Start: 2020-12-03 | End: 2020-12-05 | Stop reason: HOSPADM

## 2020-12-03 RX ORDER — SODIUM CHLORIDE 0.9 % (FLUSH) 0.9 %
5-40 SYRINGE (ML) INJECTION AS NEEDED
Status: DISCONTINUED | OUTPATIENT
Start: 2020-12-03 | End: 2020-12-05 | Stop reason: HOSPADM

## 2020-12-03 RX ORDER — OXYTOCIN/0.9 % SODIUM CHLORIDE 30/500 ML
PLASTIC BAG, INJECTION (ML) INTRAVENOUS
Status: DISCONTINUED
Start: 2020-12-03 | End: 2020-12-03

## 2020-12-03 RX ORDER — DOCUSATE SODIUM 100 MG/1
100 CAPSULE, LIQUID FILLED ORAL
Status: DISCONTINUED | OUTPATIENT
Start: 2020-12-03 | End: 2020-12-05 | Stop reason: HOSPADM

## 2020-12-03 RX ORDER — OXYTOCIN/RINGER'S LACTATE 30/500 ML
PLASTIC BAG, INJECTION (ML) INTRAVENOUS
Status: DISCONTINUED | OUTPATIENT
Start: 2020-12-03 | End: 2020-12-03 | Stop reason: HOSPADM

## 2020-12-03 RX ORDER — ZOLPIDEM TARTRATE 5 MG/1
5 TABLET ORAL
Status: DISCONTINUED | OUTPATIENT
Start: 2020-12-03 | End: 2020-12-05 | Stop reason: HOSPADM

## 2020-12-03 RX ORDER — FAMOTIDINE 10 MG/ML
INJECTION INTRAVENOUS
Status: COMPLETED
Start: 2020-12-03 | End: 2020-12-03

## 2020-12-03 RX ORDER — OXYTOCIN/0.9 % SODIUM CHLORIDE 30/500 ML
95 PLASTIC BAG, INJECTION (ML) INTRAVENOUS AS NEEDED
Status: COMPLETED | OUTPATIENT
Start: 2020-12-03 | End: 2020-12-03

## 2020-12-03 RX ADMIN — CLINDAMYCIN PHOSPHATE 900 MG: 900 INJECTION, SOLUTION INTRAVENOUS at 19:43

## 2020-12-03 RX ADMIN — MORPHINE SULFATE 500 MCG: 0.5 INJECTION, SOLUTION EPIDURAL; INTRATHECAL; INTRAVENOUS at 20:12

## 2020-12-03 RX ADMIN — Medication 95 MILLI-UNITS/MIN: at 23:17

## 2020-12-03 RX ADMIN — SODIUM CHLORIDE, POTASSIUM CHLORIDE, SODIUM LACTATE AND CALCIUM CHLORIDE: 600; 310; 30; 20 INJECTION, SOLUTION INTRAVENOUS at 19:31

## 2020-12-03 RX ADMIN — SODIUM CHLORIDE, POTASSIUM CHLORIDE, SODIUM LACTATE AND CALCIUM CHLORIDE 125 ML/HR: 600; 310; 30; 20 INJECTION, SOLUTION INTRAVENOUS at 16:28

## 2020-12-03 RX ADMIN — SODIUM CHLORIDE, POTASSIUM CHLORIDE, SODIUM LACTATE AND CALCIUM CHLORIDE 85 ML/HR: 600; 310; 30; 20 INJECTION, SOLUTION INTRAVENOUS at 23:18

## 2020-12-03 RX ADMIN — BUPIVACAINE HYDROCHLORIDE 2 ML: 7.5 INJECTION, SOLUTION EPIDURAL; RETROBULBAR at 20:12

## 2020-12-03 RX ADMIN — Medication 500 ML/HR: at 21:00

## 2020-12-03 RX ADMIN — FAMOTIDINE: 10 INJECTION, SOLUTION INTRAVENOUS at 19:37

## 2020-12-03 RX ADMIN — MORPHINE SULFATE 1.5 MG: 0.5 INJECTION, SOLUTION EPIDURAL; INTRATHECAL; INTRAVENOUS at 20:55

## 2020-12-03 RX ADMIN — OXYTOCIN-SODIUM CHLORIDE 0.9% IV SOLN 30 UNIT/500ML 95 MILLI-UNITS/MIN: 30-0.9/5 SOLUTION at 23:17

## 2020-12-03 RX ADMIN — MORPHINE SULFATE 2 MG: 0.5 INJECTION, SOLUTION EPIDURAL; INTRATHECAL; INTRAVENOUS at 20:45

## 2020-12-03 RX ADMIN — SODIUM CHLORIDE, POTASSIUM CHLORIDE, SODIUM LACTATE AND CALCIUM CHLORIDE 1000 ML: 600; 310; 30; 20 INJECTION, SOLUTION INTRAVENOUS at 19:22

## 2020-12-03 RX ADMIN — MORPHINE SULFATE 1 MG: 0.5 INJECTION, SOLUTION EPIDURAL; INTRATHECAL; INTRAVENOUS at 20:50

## 2020-12-03 RX ADMIN — ZIDOVUDINE 270 MG: 10 INJECTION, SOLUTION INTRAVENOUS at 19:51

## 2020-12-03 RX ADMIN — Medication 10 ML: at 23:19

## 2020-12-03 RX ADMIN — DEXAMETHASONE SODIUM PHOSPHATE 10 MG: 10 INJECTION INTRAMUSCULAR; INTRAVENOUS at 20:25

## 2020-12-03 RX ADMIN — ONDANSETRON 4 MG: 2 INJECTION INTRAMUSCULAR; INTRAVENOUS at 19:37

## 2020-12-03 RX ADMIN — GENTAMICIN SULFATE 100 MG: 40 INJECTION, SOLUTION INTRAMUSCULAR; INTRAVENOUS at 19:56

## 2020-12-03 RX ADMIN — SODIUM CITRATE AND CITRIC ACID MONOHYDRATE 30 ML: 500; 334 SOLUTION ORAL at 19:37

## 2020-12-03 RX ADMIN — Medication 1500 ML/HR: at 20:37

## 2020-12-03 RX ADMIN — DIPHENHYDRAMINE HYDROCHLORIDE 25 MG: 50 INJECTION, SOLUTION INTRAMUSCULAR; INTRAVENOUS at 23:22

## 2020-12-03 RX ADMIN — KETOROLAC TROMETHAMINE 30 MG: 30 INJECTION, SOLUTION INTRAMUSCULAR at 23:12

## 2020-12-03 RX ADMIN — ENOXAPARIN SODIUM 40 MG: 40 INJECTION SUBCUTANEOUS at 23:45

## 2020-12-03 NOTE — PROGRESS NOTES
1600 Patient sent over from [facility] with complaints of leaking fluid and oligo. CLARISSA 4.6 per [facility]. No fluid seen in bed, or on clothes. No complaints of pain at this time. See flowsheet for charting. 36 Dr. Lindsey Parr notified of patient's arrival    [address]. Portillo Cluster in to examine patient. Cervix is closed. Situation explained to patient and mutual decision to perform a csection made.

## 2020-12-03 NOTE — H&P
OB ADMISSION NOTE    CHIEF COMPLAINT/HISTORY OF PRESENT ILLNESS: The patient is a 23year-old with an IUP of 37-3/7 weeks who is a G:3 P:1,1,0,1 with an MACEY of 21 DEC 2020. She is blood type and group O positive, rubella immune and her GBS status is negative. She has history of previous  section, HIV and obesity. She follows with Dr. Karlie Lozano. She was seen today by MFM who recommended admission and delivery due to oligohydramnios. Patient states having leak of water since yesterday. Denies bleeding. She did have fetal movements. MEDICATIONS: Prenatal multivitamins, Retrovir    PHYSICAL EXAM:  Visit Vitals  BP (!) 141/84   Pulse 89   Temp 99.1 °F (37.3 °C)   Resp 16   Ht 5' 10\" (1.778 m)   Wt 134.7 kg (297 lb)   Breastfeeding No   BMI 42.62 kg/m²     LUNGS: Clear to auscultation  HEART: Regular rhythm, no murmurs. Abdomen: Gravid, fetal heart tones present. PELVIC EXAM: Cervical dilation: Closed, effacement: 30%, presentation: Vertex, station: -3, membranes: Possible rupture, no evidence of active watery discharge. ASSESSMENT:  Pregnancy at 37-3/7-week gestation  History  section  History of HIV  Oligohydramnios    Patient has history of previous successful vaginal birth after  section and she was interested in a TOLAC. In view of unfavorable cervix and severe oligohydramnios, treatment options discussed in detail including expectant management for vaginal delivery versus a repeat  section. Patient consented for a  section at this time. Plan: Admit to OB byers, continue fetal monitoring, please see admit orders.

## 2020-12-04 LAB
BASOPHILS # BLD: 0 K/UL (ref 0–0.1)
BASOPHILS NFR BLD: 0 % (ref 0–1)
DIFFERENTIAL METHOD BLD: ABNORMAL
EOSINOPHIL # BLD: 0.1 K/UL (ref 0–0.4)
EOSINOPHIL NFR BLD: 1 % (ref 0–7)
ERYTHROCYTE [DISTWIDTH] IN BLOOD BY AUTOMATED COUNT: 15.1 % (ref 11.5–14.5)
HCT VFR BLD AUTO: 27 % (ref 35–47)
HGB BLD-MCNC: 8.7 G/DL (ref 11.5–16)
IMM GRANULOCYTES # BLD AUTO: 0 K/UL (ref 0–0.04)
IMM GRANULOCYTES NFR BLD AUTO: 0 % (ref 0–0.5)
LYMPHOCYTES # BLD: 1.4 K/UL (ref 0.8–3.5)
LYMPHOCYTES NFR BLD: 18 % (ref 12–49)
MCH RBC QN AUTO: 26.8 PG (ref 26–34)
MCHC RBC AUTO-ENTMCNC: 32.2 G/DL (ref 30–36.5)
MCV RBC AUTO: 83.1 FL (ref 80–99)
MONOCYTES # BLD: 0.4 K/UL (ref 0–1)
MONOCYTES NFR BLD: 6 % (ref 5–13)
NEUTS SEG # BLD: 5.8 K/UL (ref 1.8–8)
NEUTS SEG NFR BLD: 75 % (ref 32–75)
PLATELET # BLD AUTO: 234 K/UL (ref 150–400)
PMV BLD AUTO: 9.5 FL (ref 8.9–12.9)
RBC # BLD AUTO: 3.25 M/UL (ref 3.8–5.2)
WBC # BLD AUTO: 7.7 K/UL (ref 3.6–11)

## 2020-12-04 PROCEDURE — 75410000002 HC LABOR FEE PER 1 HR

## 2020-12-04 PROCEDURE — 74011250636 HC RX REV CODE- 250/636: Performed by: OBSTETRICS & GYNECOLOGY

## 2020-12-04 PROCEDURE — 74011250637 HC RX REV CODE- 250/637: Performed by: OBSTETRICS & GYNECOLOGY

## 2020-12-04 PROCEDURE — 36415 COLL VENOUS BLD VENIPUNCTURE: CPT

## 2020-12-04 PROCEDURE — 65410000002 HC RM PRIVATE OB

## 2020-12-04 PROCEDURE — 85025 COMPLETE CBC W/AUTO DIFF WBC: CPT

## 2020-12-04 PROCEDURE — 75410000003 HC RECOV DEL/VAG/CSECN EA 0.5 HR

## 2020-12-04 PROCEDURE — 74011000258 HC RX REV CODE- 258: Performed by: OBSTETRICS & GYNECOLOGY

## 2020-12-04 RX ORDER — OXYCODONE AND ACETAMINOPHEN 5; 325 MG/1; MG/1
1 TABLET ORAL
Status: DISCONTINUED | OUTPATIENT
Start: 2020-12-04 | End: 2020-12-05 | Stop reason: HOSPADM

## 2020-12-04 RX ADMIN — CLINDAMYCIN PHOSPHATE 900 MG: 900 INJECTION, SOLUTION INTRAVENOUS at 13:08

## 2020-12-04 RX ADMIN — ENOXAPARIN SODIUM 40 MG: 40 INJECTION SUBCUTANEOUS at 22:41

## 2020-12-04 RX ADMIN — GENTAMICIN SULFATE 100 MG: 40 INJECTION, SOLUTION INTRAMUSCULAR; INTRAVENOUS at 05:32

## 2020-12-04 RX ADMIN — IBUPROFEN 800 MG: 800 TABLET ORAL at 05:00

## 2020-12-04 RX ADMIN — CLINDAMYCIN PHOSPHATE 900 MG: 900 INJECTION, SOLUTION INTRAVENOUS at 05:32

## 2020-12-04 RX ADMIN — GENTAMICIN SULFATE 100 MG: 40 INJECTION, SOLUTION INTRAMUSCULAR; INTRAVENOUS at 12:03

## 2020-12-04 RX ADMIN — OXYCODONE HYDROCHLORIDE AND ACETAMINOPHEN 2 TABLET: 5; 325 TABLET ORAL at 15:58

## 2020-12-04 RX ADMIN — IBUPROFEN 800 MG: 800 TABLET ORAL at 21:32

## 2020-12-04 RX ADMIN — DIPHENHYDRAMINE HYDROCHLORIDE 25 MG: 50 INJECTION, SOLUTION INTRAMUSCULAR; INTRAVENOUS at 04:57

## 2020-12-04 RX ADMIN — IBUPROFEN 800 MG: 800 TABLET ORAL at 14:55

## 2020-12-04 NOTE — PROGRESS NOTES
Received pt from l/d per bed to room 327 accompanied by SOILA Berger RN and CANDIDA Perez RN. Bedside report received CANDIDA Perez RN. Mommy manual , birth certificate , medications side effects sheet , white board , hourly rounding , informations given and reviewed with pt , verbalized understandings. 0100 Pt voices no c/o.  0200 voices no c/o.  0300 voices no c/o.  0500 Uma care done ,lochia small amount of rubra noted. Pt voices no c/o.  0700 Bedside report given to RUSSEL Lopez RN.

## 2020-12-04 NOTE — ANESTHESIA PROCEDURE NOTES
Spinal Block    Start time: 12/3/2020 8:10 PM  End time: 12/3/2020 8:20 PM  Performed by: Lee Cm CRNA  Authorized by: Lee Cm CRNA     Pre-procedure:   Indications: at surgeon's request and primary anesthetic  Preanesthetic Checklist: patient identified, risks and benefits discussed, anesthesia consent, site marked, patient being monitored and timeout performed    Timeout Time: 20:09          Spinal Block:   Patient Position:  Seated  Prep Region:  Lumbar  Prep: chlorhexidine      Location:  L4-5  Technique:  Single shot        Needle:   Needle Type:  Pencan  Needle Gauge:  22 G  Attempts:  1      Events: CSF confirmed        Assessment:  Insertion:  Uncomplicated  Patient tolerance:  Patient tolerated the procedure well with no immediate complications

## 2020-12-04 NOTE — PROCEDURES
OPERATIVE NOTE:   SECTION / Ashley Nya    PREOPERATIVE DIAGNOSIS:  Pregnancy at 37-3/7-week gestation  History  section  History of HIV  Oligohydramnios    POSTOPERATIVE DIAGNOSIS:   Pregnancy at 37-3/7-week gestation delivered  Delivery  via  section/Maylard  History  section  History of HIV  Oligohydramnios    PROCEDURE: Low transverse  section    ANESTHESIA: Spinal    ANESTHESIOLOGIST: Crystal Ruth CRNA    SURGEON: Chinedu Amor M.D.    ASSISTANT: Perla Waddell    COMPLICATIONS: None    CONDITION DURING SURGERY: Stable    ESTIMATED BLOOD LOSS: 300 mL    SURGICAL COUNTS: Correct x 3    SPECIMEN: None    FINDINGS: Gravid uterus, normal ovaries and fallopian tubes. Baby:Boy. Presentation: Vertex. Apgar:8/9/9. DESCRIPTION OF PROCEDURE: After risks of the procedure were explained to the patient, she was taken to the operating room. A timeout was taken. All were in agreement. The patient had CHARLES hose and pneumoboots in place and functioning. She was placed in the dorsal supine position, prepped and draped in the usual sterile fashion with sufficient anesthesia. The abdomen was opened through a Maylard incision. The incision was then extended to the level of the fascia with electrocautery and subsequently the rectus abdominal muscle reaching the peritoneal layer. The peritoneal layer entered and extended. The bladder blade was inserted. Donal Schwalbe was used for superior retraction. The uterovesical peritoneum was nicked in midline with Metzenbaum scissors and extended in a curvilinear fashion with formation of a bladder flap. The uterus was then scored in the midline using the surgeon's knife. Using both the surgeon's index fingers, the incision was extended transversely. The infant's head was then delivered and bulb suction followed by delivery of torso and lower body.   The cord was doubly clamped and cut as the infant was passed to the nursery personnel in attendance. Placenta was delivered intact and the uterus was wiped clear of blood and clot with wet laparotomy pads. The uterus was then exteriorized and wrapped with a clean wet lap and closed in a double layer suture of 1 chromic. Pelvic was irrigated using normal saline. The Richarson and bladder blade were removed. The fascia was closed with running suture of 1 Vicryl. Subcutaneous layer was approximated with a 2-0 plain gut suture in an interrupted fashion. The skin was reapproximated using absorbable staples, and a pressure dressing was applied at the surgical site. Patient tolerated the procedure well with no complications.

## 2020-12-04 NOTE — PROGRESS NOTES
Pt transferred via bed to room 327 with infant in her arms. SO at bedside with all belongings. Pt denies pain at this time. Bedside report given to Diego Marie RN. Dsg to lower abd dry and intact with small drainage noted. The drainage circled and will continued to be monitored.

## 2020-12-04 NOTE — PROGRESS NOTES
1500: rounded with report from irene Lentz rn. Patient aware to increase PO intake of water. Water pitcher filled. Discussed use of warm mirna-bottle to aid in urinating     1555: dr Orion Alonso paged via     0144: dr Orion Alonso returned page. Orders given for straight cath x1 and if no void in 6-8 hours insert brian. 1857: Bedside shift report given to quoc Guthrie

## 2020-12-04 NOTE — PROGRESS NOTES
Progress Note    Patient: Maday Jordan MRN: [id number]  SSN: [id number]    YOB: 2000  Age: 23 y.o. Sex: female      Admit Date: 12/3/2020    LOS: 1 day     Subjective:     Patient is without complaints, she reports minimal lochia, she is tolerating a regular diet    Objective:     Vitals:    20 0400 20 0500 20 0600 20 0700   BP:  (!) 142/76  125/70   Pulse:  74  87   Resp: 18 18 18 18   Temp:  98.8 °F (37.1 °C)  98.2 °F (36.8 °C)   SpO2:  98%  98%   Weight:       Height:            Physical Exam:   Lungs are clear to auscultation  Heart is with regular rate and rhythm  Fundus is below umbilicus  Incision is clean dry and intact  Extremities are without clubbing cyanosis or edema    Lab/Data Review:  Hemoglobin 8.7    Assessment:   Postoperative day #1 status post repeat low transverse  section, stable.   Principal Problem:    Pregnancy with 37 weeks completed gestation (12/3/2020)    Active Problems:    HIV infection ([facility] [address] (2020)      History of  delivery (8/10/2020)      Oligohydramnios in third trimester (12/3/2020)        Plan:     Routine postoperative care    Signed By: Delphine Ann MD     2020

## 2020-12-04 NOTE — ANESTHESIA PREPROCEDURE EVALUATION
Relevant Problems   No relevant active problems       Anesthetic History   No history of anesthetic complications            Review of Systems / Medical History  Patient summary reviewed, nursing notes reviewed and pertinent labs reviewed    Pulmonary            Asthma        Neuro/Psych         Psychiatric history    Comments: Depression  Anxiety Cardiovascular    Hypertension                   GI/Hepatic/Renal  Within defined limits              Endo/Other        Morbid obesity and anemia     Other Findings   Comments: HIV positive  Sickle cell trait           Physical Exam    Airway  Mallampati: III  TM Distance: 4 - 6 cm  Neck ROM: normal range of motion   Mouth opening: Normal    Comments: Pregnant consider difficult AW Cardiovascular    Rhythm: regular  Rate: normal         Dental  No notable dental hx       Pulmonary  Breath sounds clear to auscultation               Abdominal  GI exam deferred       Other Findings            Anesthetic Plan    ASA: 3  Anesthesia type: spinal            Anesthetic plan and risks discussed with: Patient and Family      Benefits and risks of spinal anesthesia discussed with patient/family. All questions answered.

## 2020-12-04 NOTE — ANESTHESIA POSTPROCEDURE EVALUATION
Procedure(s):   SECTION.     spinal    Anesthesia Post Evaluation      Multimodal analgesia: multimodal analgesia used between 6 hours prior to anesthesia start to PACU discharge  Patient location during evaluation: bedside  Patient participation: complete - patient participated  Level of consciousness: awake  Pain management: adequate  Airway patency: patent  Anesthetic complications: no  Cardiovascular status: acceptable  Respiratory status: acceptable  Hydration status: acceptable  Post anesthesia nausea and vomiting:  none  Final Post Anesthesia Temperature Assessment:  Normothermia (36.0-37.5 degrees C)      INITIAL Post-op Vital signs:   Vitals Value Taken Time   /72 12/3/2020  9:22 PM   Temp 36.3 °C (97.3 °F) 12/3/2020  9:22 PM   Pulse 84 12/3/2020  9:22 PM   Resp 16 12/3/2020  9:22 PM   SpO2 100 % 12/3/2020  9:22 PM

## 2020-12-04 NOTE — PROGRESS NOTES
12/03/20 2245   Maternal Vital Signs   Pulse (Heart Rate) 81   BP (!) 143/88   MAP (Monitor) 101   MAP (Calculated) 106   Dr. Lindsey Parr made aware verbally of pt BP. No new orders received at this time.

## 2020-12-04 NOTE — PROGRESS NOTES
Problem: Patient Education: Go to Patient Education Activity  Goal: Patient/Family Education  Outcome: Progressing Towards Goal     Problem:  Delivery: Day of Delivery  Goal: Off Pathway (Use only if patient is Off Pathway)  Outcome: Progressing Towards Goal  Goal: Activity/Safety  Outcome: Progressing Towards Goal

## 2020-12-05 VITALS
DIASTOLIC BLOOD PRESSURE: 68 MMHG | WEIGHT: 293 LBS | BODY MASS INDEX: 41.95 KG/M2 | RESPIRATION RATE: 18 BRPM | SYSTOLIC BLOOD PRESSURE: 122 MMHG | TEMPERATURE: 98.7 F | OXYGEN SATURATION: 99 % | HEIGHT: 70 IN | HEART RATE: 95 BPM

## 2020-12-05 LAB
BACTERIA SPEC CULT: NORMAL
SPECIAL REQUESTS,SREQ: NORMAL

## 2020-12-05 PROCEDURE — 74011250636 HC RX REV CODE- 250/636: Performed by: OBSTETRICS & GYNECOLOGY

## 2020-12-05 PROCEDURE — 74011250637 HC RX REV CODE- 250/637: Performed by: OBSTETRICS & GYNECOLOGY

## 2020-12-05 RX ORDER — OXYCODONE AND ACETAMINOPHEN 5; 325 MG/1; MG/1
1 TABLET ORAL
Qty: 30 TAB | Refills: 0 | Status: SHIPPED | OUTPATIENT
Start: 2020-12-05 | End: 2020-12-10

## 2020-12-05 RX ORDER — IBUPROFEN 800 MG/1
800 TABLET ORAL EVERY 8 HOURS
Qty: 40 TAB | Refills: 0 | Status: SHIPPED | OUTPATIENT
Start: 2020-12-05

## 2020-12-05 RX ADMIN — IBUPROFEN 800 MG: 800 TABLET ORAL at 05:21

## 2020-12-05 RX ADMIN — DIPHENHYDRAMINE HYDROCHLORIDE 25 MG: 50 INJECTION, SOLUTION INTRAMUSCULAR; INTRAVENOUS at 05:25

## 2020-12-05 RX ADMIN — IBUPROFEN 800 MG: 800 TABLET ORAL at 14:12

## 2020-12-05 RX ADMIN — OXYCODONE HYDROCHLORIDE AND ACETAMINOPHEN 2 TABLET: 5; 325 TABLET ORAL at 03:19

## 2020-12-05 NOTE — DISCHARGE INSTRUCTIONS
Patient Education   Patient Education   Patient Education   Patient Education   Patient Education   Patient Education   Ibuprofen (By mouth)   Ibuprofen (eye-bue-PROE-fen)  Treats pain and fever. This medicine is an NSAID. Brand Name(s): Advil, Advil Children's, Advil Liqui-Gels, Advil Migraine, All-Purpose First Aid Kit, Children's Ibuprofen, Children's Motrin, Comfort Pac, Concentrated Motrin Infants' Drops, Equate Ibuprofen Vega Strength, Genpril, [facility] Ibuprofen Infants', [facility] Children's Ibuprofen, [facility] Ibuprofen, [facility] Ibuprofen Vega Strength   There may be other brand names for this medicine. When This Medicine Should Not Be Used: This medicine is not right for everyone. Do not use if you had an allergic reaction (including asthma) to ibuprofen, aspirin, or another NSAID, or right before or after heart surgery. How to Use This Medicine:   Capsule, Liquid Filled Capsule, Suspension, Tablet, Chewable Tablet  · Your doctor will tell you how much medicine to use. Do not use more than directed. · Prescription ibuprofen should come with a Medication Guide. Ask your pharmacist for the Medication Guide if you do not have one. · Follow the instructions on the medicine label if you are using this medicine without a prescription. · Take this medicine with food or milk if it upsets your stomach. · Oral liquid: Shake well just before using. Measure with a marked measuring spoon, oral syringe, or medicine cup. · Chewable tablet: Chew completely before you swallow it. Then drink some water to make sure you swallow all of the medicine. · For Children: Ask your pharmacist if you are not sure how much medicine to give a child. The dose is usually based on weight, not age. Never give more medicine than directed. · For Adults: Do not take more than 6 pills in 1 day (24 hours) unless your doctor tells you to. · Missed dose:  If you take this medicine on a regular basis and miss a dose, take it as soon as you can. If it is almost time for your next dose, wait until then to use the medicine and skip the missed dose. Do not use extra medicine to make up for a missed dose. · Store the medicine in a closed container at room temperature, away from heat, moisture, and direct light. Do not freeze the oral liquid. Drugs and Foods to Avoid:   Ask your doctor or pharmacist before using any other medicine, including over-the-counter medicines, vitamins, and herbal products. · Some foods and medicine can affect how ibuprofen works. Tell your doctor if you are also using lithium, methotrexate, a blood thinner (such as warfarin), a steroid medicine (such as hydrocortisone, prednisolone, prednisone), a diuretic (water pill), or an ACE inhibitor blood pressure medicine. · Do not use any other NSAID medicine unless your doctor says it is okay. Some other NSAIDs are aspirin, diclofenac, naproxen, or celecoxib. · Do not drink alcohol while you are using this medicine. Warnings While Using This Medicine:   · Tell your doctor if you are pregnant or breastfeeding. Do not use this medicine during the later part of pregnancy. · Tell your doctor if you have kidney disease, liver disease, asthma, lupus or a similar connective tissue disease, or a history of ulcers or other digestion problems. Tell your doctor if you smoke or have heart or blood circulation problems, including high blood pressure, heart failure (CHF), or bleeding problems. · This medicine may cause the following problems:  ¨ Bleeding and ulcers in the stomach or intestines  ¨ Higher risk of heart attack or stroke  ¨ Liver damage  ¨ Kidney damage  ¨ Vision problems  · Call your doctor if symptoms get worse, pain lasts more than 10 days, or fever lasts more than 3 days. · This medicine might contain sugar or phenylalanine (aspartame).   · Tell any doctor or dentist who treats you that you are using this medicine. · Keep all medicine out of the reach of children. Never share your medicine with anyone. Possible Side Effects While Using This Medicine:   Call your doctor right away if you notice any of these side effects:  · Allergic reaction: Itching or hives, swelling in your face or hands, swelling or tingling in your mouth or throat, chest tightness, trouble breathing  · Blistering, peeling, or red skin rash  · Change in how much or how often you urinate  · Chest pain that may spread to your arms, jaw, back, or neck, trouble breathing, nausea, unusual sweating, faintness  · Chest pain, trouble breathing, weakness on one side of your body, severe headache, trouble seeing or talking, pain in your lower leg  · Dark urine or pale stools, nausea, vomiting, loss of appetite, stomach pain, yellow skin or eyes  · Fever, neck pain, stiff neck  · Severe stomach pain, vomiting blood, bloody or black, tarry stools  · Swelling in your hands, ankles, or feet, rapid weight gain  · Trouble seeing, blind spots, change in how you see colors  · Unusual bleeding, bruising, or weakness  If you notice these less serious side effects, talk with your doctor:   · Constipation, diarrhea, gas, mild upset stomach  · Dizziness, headache, ringing in the ears  If you notice other side effects that you think are caused by this medicine, tell your doctor. Call your doctor for medical advice about side effects. You may report side effects to FDA at 0-357-FDA-9261  © 2017 [facility] Information is for End User's use only and may not be sold, redistributed or otherwise used for commercial purposes. The above information is an  only. It is not intended as medical advice for individual conditions or treatments. Talk to your doctor, nurse or pharmacist before following any medical regimen to see if it is safe and effective for you.         Oxycodone/Acetaminophen (Percocet, Roxicet) - (By mouth)   Why this medicine is used:   Treats pain. This medicine contains a narcotic pain reliever. Contact a nurse or doctor right away if you have:  · Extreme weakness, shallow breathing, slow heartbeat  · Sweating or cold, clammy skin  · Skin blisters, rash, or peeling     Common side effects:  · Constipation  · Nausea, vomiting  · Tiredness  2017 [facility] Information is for End User's use only and may not be sold, redistributed or otherwise used for commercial purposes.  Section: What to Expect at Home    Your Recovery:  A  section, or , is surgery to deliver your baby through a cut, called an incision that the doctor makes in your lower belly and uterus. You may have some pain in your lower belly and need pain medicine for 1 to 2 weeks. You can expect some vaginal bleeding for several weeks. You will probably need about 6 weeks to fully recover. It is important to take it easy while the incision is healing. Avoid heavy lifting, strenuous activities, or exercises that strain the belly muscles while you are recovering. Ask a family member or friend for help with housework, cooking, and shopping. This care sheet gives you a general idea about how long it will take for you to recover. But each person recovers at a different pace. Follow the steps below to get better as quickly as possible. How can you care for yourself at home? Activity  · Rest when you feel tired. Getting enough sleep will help you recover. · Try to walk each day. Start by walking a little more than you did the day before. Bit by bit, increase the amount you walk. Walking boosts blood flow and helps prevent pneumonia, constipation, and blood clots. · Avoid strenuous activities, such as bicycle riding, jogging, weightlifting, and aerobic exercise, for 6 weeks or until your doctor says it is okay. · Until your doctor says it is okay, do not lift anything heavier than your baby.   · Do not do sit-ups or other exercise that strain the belly muscles for 6 weeks or until your doctor says it is okay. · Hold a pillow over your incision when you cough or take deep breaths. This will support your belly and decrease your pain. · You may shower as usual. Pat the incision dry when you are done. · You will have some vaginal bleeding. Wear sanitary pads. Do not douche or use tampons until your doctor says it is okay. · Ask your doctor when you can drive again. · You will probably need to take at least 6 weeks off work. It depends on the type of work you do and how you feel. · Wait until you are healed (about 4 to 6 weeks) before you have sexual intercourse. Your doctor will tell you when it is okay to have sex. · Talk to your doctor about birth control. You can get pregnant even before your period returns. Also, you can get pregnant while you are breast-feeding. Incision care  Your skin is your body's first line of defense against germs, but an incision site leaves an easy way for germs to enter your body. To prevent infection:  · Clean your hands frequently and before and after changing any touching any dressings. · If you have strips of tape on the incision, leave the tape on for a week or until it falls off. · Look at your incision closely every day for any changes. Contact your doctor if you experience any signs of infection, such as fever or increased redness at the surgical site. · Wash the area daily with warm, soapy water, and pat it dry. Don't use hydrogen peroxide or alcohol, which can slow healing. You may cover the area with a gauze bandage if it weeps or rubs against clothing. Change the bandage every day. · Keep the area clean and dry. Diet  · You can eat your normal diet. If your stomach is upset, try bland, low-fat foods like plain rice, broiled chicken, toast, and yogurt. · Drink plenty of fluids (unless your doctor tells you not to).   · You may notice that your bowel movements are not regular right after your surgery. This is common. Try to avoid constipation and straining with bowel movements. You may want to take a fiber supplement every day. If you have not had a bowel movement after a couple of days, ask your doctor about taking a mild laxative. · If you are breast-feeding, do not drink any alcohol. Medicines  · Take pain medicines exactly as directed. · If the doctor gave you a prescription medicine for pain, take it as prescribed. · If you are not taking a prescription pain medicine, ask your doctor if you can take an over-the-counter medicine such as acetaminophen (Tylenol), ibuprofen (Advil, Motrin), or naproxen (Aleve), for cramps. Read and follow all instructions on the label. Do not take aspirin, because it can cause more bleeding. Do not take acetaminophen (Tylenol) and other acetaminophen containing medications (i.e. Percocet) at the same time. · If you think your pain medicine is making you sick to your stomach:  · Take your medicine after meals (unless your doctor has told you not to). · Ask your doctor for a different pain medicine. · If your doctor prescribed antibiotics, take them as directed. Do not stop taking them just because you feel better. You need to take the full course of antibiotics. Mental Health  · Many women get the \"baby blues\" during the first few days after childbirth. You may lose sleep, feel irritable, and cry easily. You may feel happy one minute and sad the next. Hormone changes are one cause of these emotional changes. Also, the demands of a new baby, along with visits from relatives or other family needs, add to a mother's stress. The \"baby blues\" often peak around the fourth day. Then they ease up in less than 2 weeks. · If your moodiness or anxiety lasts for more than 2 weeks, or if you feel like life is not worth living, you may have postpartum depression. This is different for each mother.  Some mothers with serious depression may worry intensely about their infant's well-being. Others may feel distant from their child. Some mothers might even feel that they might harm their baby. A mother may have signs of paranoia, wondering if someone is watching her. · With all the changes in your life, you may not know if you are depressed. Pregnancy sometimes causes changes in how you feel that are similar to the symptoms of depression. · Symptoms of depression include:  · Feeling sad or hopeless and losing interest in daily activities. These are the most common symptoms of depression. · Sleeping too much or not enough. · Feeling tired. You may feel as if you have no energy. · Eating too much or too little. · [employer] (PSI) offers a Warm line; Chat with the Expert phone sessions; Information and Articles about Pregnancy and Postpartum Mood Disorders; Comprehensive List of Free Support Groups; Knowledgeable local coordinators who will offer support, information, and resources; Guide to Resources on [employer]; Calendar of events in the  mood disorders community; Latest News and Research; and [facility] [address] for [employer]. Remember - You are not alone; You are not to blame; With help, you will be well. 0-690-150-PPD(2262). WWW. POSTPARTUM. NET   · Writing or talking about death, such as writing suicide notes or talking about guns, knives, or pills. Keep the numbers for these national suicide hotlines: 4-994-261-TALK (6-856.281.4834) and 0-098-FOAKFJG (5-556.660.4021). If you or someone you know talks about suicide or feeling hopeless, get help right away. Other instructions  · If you breast-feed your baby, you may be more comfortable while you are healing if you place the baby so that he or she is not resting on your belly. Try tucking your baby under your arm, with his or her body along the side you will be feeding on. Support your baby's upper body with your arm.  With that hand you can control your baby's head to bring his or her mouth to your breast. This is sometimes called the football hold. Follow-up care is a key part of your treatment and safety. Be sure to make and go to all appointments, and call your doctor if you are having problems. It's also a good idea to know your test results and keep a list of the medicines you take. When should you call for help? Call 911 anytime you think you may need emergency care. For example, call if:  · You are thinking of hurting yourself, your baby, or anyone else. · You passed out (lost consciousness). · You have symptoms of a blood clot in your lung (called a pulmonary embolism). These may include:  · Sudden chest pain. · Trouble breathing. · Coughing up blood. Call your doctor now or seek immediate medical care if:    · You have severe vaginal bleeding. · You are soaking through a pad each hour for 2 or more hours. · Your vaginal bleeding seems to be getting heavier or is still bright red 4 days after delivery. · You are dizzy or lightheaded, or you feel like you may faint. · You are vomiting or cannot keep fluids down. · You have a fever. · You have new or more belly pain. · You have loose stitches, or your incision comes open. · You have symptoms of infection, such as:  · Increased pain, swelling, warmth, or redness. · Red streaks leading from the incision. · Pus draining from the incision. · A fever  · You pass tissue (not just blood). · Your vaginal discharge smells bad. · Your belly feels tender or full and hard. · Your breasts are continuously painful or red. · You feel sad, anxious, or hopeless for more than a few days. · You have sudden, severe pain in your belly. · You have symptoms of a blood clot in your leg (called a deep vein thrombosis), such as:  · Pain in your calf, back of the knee, thigh, or groin. · Redness and swelling in your leg or groin.   · You have symptoms of preeclampsia, such as:  · Sudden swelling of your face, hands, or feet. · New vision problems (such as dimness or blurring). · A severe headache. · Your blood pressure is higher than it should be or rises suddenly. · You have new nausea or vomiting. Watch closely for changes in your health, and be sure to contact your doctor if you have any problems. Postpartum: Care Instructions  Your Care Instructions  After childbirth (postpartum period), your body goes through many changes. Some of these changes happen over several weeks. In the hours after delivery, your body will begin to recover from childbirth while it prepares to breastfeed your . You may feel emotional during this time. Your hormones can shift your mood without warning for no clear reason. In the first couple of weeks after childbirth, many women have emotions that change from happy to sad. You may find it hard to sleep. You may cry a lot. This is called the \"baby blues. \" These overwhelming emotions often go away within a couple of days or weeks. But it's important to discuss your feelings with your doctor. It is easy to get too tired and overwhelmed during the first weeks after childbirth. Don't try to do too much. Get rest whenever you can, accept help from others, and eat well and drink plenty of fluids. In the first couple of weeks after giving birth, your doctor or midwife may want to check in with you and make a plan for any follow-up care you may need. You will likely have a complete postpartum visit in the first 3 months after delivery. At that time, your doctor or midwife will check on your recovery from childbirth. He or she will also see how you are doing with your emotions and talk about your concerns or questions. Follow-up care is a key part of your treatment and safety. Be sure to make and go to all appointments, and call your doctor if you are having problems. It's also a good idea to know your test results and keep a list of the medicines you take.   How can you care for yourself at home? · Sleep or rest when your baby sleeps. · Get help with household chores from family or friends, if you can. Do not try to do it all yourself. · If you have hemorrhoids or swelling or pain around the opening of your vagina, try using cold and heat. You can put ice or a cold pack on the area for 10 to 20 minutes at a time. Put a thin cloth between the ice and your skin. Also try sitting in a few inches of warm water (sitz bath) 3 times a day and after bowel movements. · Take pain medicines exactly as directed. ? If the doctor gave you a prescription medicine for pain, take it as prescribed. ? If you are not taking a prescription pain medicine, ask your doctor if you can take an over-the-counter medicine. · Eat more fiber to avoid constipation. Include foods such as whole-grain breads and cereals, raw vegetables, raw and dried fruits, and beans. · Drink plenty of fluids, enough so that your urine is light yellow or clear like water. If you have kidney, heart, or liver disease and have to limit fluids, talk with your doctor before you increase the amount of fluids you drink. · Do not rinse inside your vagina with fluids (douche). · If you have stitches, keep the area clean by pouring or spraying warm water over the area outside your vagina and anus after you use the toilet. · Keep a list of questions to ask your doctor or midwife. Your questions might be about:  ? Changes in your breasts, such as lumps or soreness. ? When to expect your menstrual period to start again. ? What form of birth control is best for you. ? Weight you have put on during the pregnancy. ? Exercise options. ? What foods and drinks are best for you, especially if you are breastfeeding. ? Problems you might be having with breastfeeding. ? When you can have sex. Some women may want to talk about lubricants for the vagina. ? Any feelings of sadness or restlessness that you are having.   When should you call for help?   Call 911 anytime you think you may need emergency care. For example, call if:    · You have thoughts of harming yourself, your baby, or another person.     · You passed out (lost consciousness).     · You have chest pain, are short of breath, or cough up blood.     · You have a seizure. Call your doctor now or seek immediate medical care if:    · Your vaginal bleeding seems to be getting heavier.     · You are dizzy or lightheaded, or you feel like you may faint.     · You have a fever.     · You have new or more belly pain.     · You have symptoms of a blood clot in your leg (called a deep vein thrombosis), such as:  ? Pain in the calf, back of the knee, thigh, or groin. ? Redness and swelling in your leg or groin.     · You have signs of preeclampsia, such as:  ? Sudden swelling of your face, hands, or feet. ? New vision problems (such as dimness, blurring, or seeing spots). ? A severe headache. Watch closely for changes in your health, and be sure to contact your doctor if:    · You have new or worse vaginal discharge.     · You feel sad or depressed.     · You are having problems with your breasts or breastfeeding. Where can you learn more? Go to http://www.gray.com/  Enter M012 in the search box to learn more about \"Postpartum: Care Instructions. \"  Current as of: February 11, 2020               Content Version: 12.6  © 0515-9054 [employer]. Care instructions adapted under license by [employer] (which disclaims liability or warranty for this information). If you have questions about a medical condition or this instruction, always ask your healthcare professional. [place] [address] any warranty or liability for your use of this information. Stress in Parents of Infants: Care Instructions  Your Care Instructions     Meeting the increased demands of being a new parent can be a big challenge.  It is easy to get overtired and overwhelmed during the first weeks. What used to be a simple chore, such as buying groceries, is not so simple now. Plus, you have new chores, including feeding and changing your new baby. At the end of the day, you may be so tired that you feel like crying. Instead of looking forward to the next day, you may be dreading tomorrow. Like many new parents, you are burned out from the stress of having a new baby. Stress affects each of us differently, and the most effective ways to relieve it are different for each person. You can try different methods to find out which ones work best for you. As the weeks go by, you will begin to develop a rhythm with your baby. Tasks that now seem to take forever will become easier. Many women get the \"baby blues\" during the first few days after childbirth. If you are a new mother and the \"baby blues\" last more than a few days, call your doctor right away. Depression is a medical condition that requires treatment. Follow-up care is a key part of your treatment and safety. Be sure to make and go to all appointments, and call your doctor if you are having problems. It's also a good idea to know your test results and keep a list of the medicines you take. How can you care for yourself at home? · Be kind to yourself. Your new baby takes a lot of work, but he or she can give you a lot of pleasure too. Do not worry about housekeeping for a while. · Allow your friends to bring you meals or do chores. · Limit visitors to as few as you feel you can handle, or ask them not to visit for a while. Before they come, set a limit on how long they will stay. · Sleep when your baby sleeps. Even a short nap helps. · Find what triggers your stress, and avoid those things as much as you can. · If you breastfeed, learn how to collect and store some breast milk so your partner or  can feed the baby while you sleep.  Experts usually recommend waiting about a month until breastfeeding is going well before offering a bottle. · Eat a balanced diet so you can keep up your energy. · Drink plenty of fluids throughout the day. · Avoid caffeine and alcohol. Caffeine is found in coffee, tea, cola drinks, chocolate, and other foods. · Limit medicines that can make you more tired, such as tranquilizers and cold and allergy medicines. · Get regular daily exercise, such as walks, to help improve your mood. Rest after you exercise. · Be honest with yourself and those who care about you. Tell them you are stressed and tired. · Talking to other new parents can help. Ask your doctor or child's doctor to suggest support groups for new parents. Hearing that someone else is having the same experiences you are can help a lot. · If you have the baby blues for more than a few days, call your doctor right away. When should you call for help? Call 911 anytime you think you may need emergency care. For example, call if:    · You have thoughts of hurting yourself, your baby, or another person. Call your doctor now or seek immediate medical care if:    · You are having trouble caring for yourself or your baby. Watch closely for changes in your health, and be sure to contact your doctor if you have any problems. Where can you learn more? Go to http://www.gray.com/  Enter H142 in the search box to learn more about \"Stress in Parents of Infants: Care Instructions. \"  Current as of: May 27, 2020               Content Version: 12.6  © 2006-2020 [employer]. Care instructions adapted under license by [employer] (which disclaims liability or warranty for this information). If you have questions about a medical condition or this instruction, always ask your healthcare professional. [place] [address] any warranty or liability for your use of this information.             Depression After Childbirth: Care Instructions  Your Care Instructions     Many women get the \"baby blues\" during the first few days after childbirth. You may lose sleep, feel irritable, and cry easily. You may feel happy one minute and sad the next. Hormone changes are one cause of these emotional changes. Also, the demands of a new baby, along with visits from relatives or other family needs, add to a mother's stress. The \"baby blues\" often peak around the fourth day. Then they ease up in less than 2 weeks. If your moodiness or anxiety lasts for more than 2 weeks, or if you feel like life is not worth living, you may have postpartum depression. This is different for each mother. Some mothers with serious depression may worry intensely about their infant's well-being. Others may feel distant from their child. Some mothers might even feel that they might harm their baby. A mother may have signs of paranoia, wondering if someone is watching her. Depression is not a sign of weakness. It is a medical condition that requires treatment. Medicine and counseling often work well to reduce depression. Talk to your doctor about taking antidepressant medicine while breastfeeding. Follow-up care is a key part of your treatment and safety. Be sure to make and go to all appointments, and call your doctor if you are having problems. It's also a good idea to know your test results and keep a list of the medicines you take. How do you know if you are depressed? With all the changes in your life, you may not know if you are depressed. Pregnancy sometimes causes changes in how you feel that are similar to the symptoms of depression. Symptoms of depression include:  · Feeling sad or hopeless and losing interest in daily activities. These are the most common symptoms of depression. · Sleeping too much or not enough. · Feeling tired. You may feel as if you have no energy. · Eating too much or too little.   · Writing or talking about death, such as writing suicide notes or talking about guns, knives, or pills. Keep the numbers for these national suicide hotlines: 3-441-789-TALK (2-740.428.7437) and 5-918-WMFGMQD (8-726.677.9509). If you or someone you know talks about suicide or feeling hopeless, get help right away. How can you care for yourself at home? · Be safe with medicines. Take your medicines exactly as prescribed. Call your doctor if you think you are having a problem with your medicine. · Eat a healthy diet so that you can keep up your energy. · Get regular daily exercise, such as walks, to help improve your mood. · Get as much sunlight as possible. Keep your shades and curtains open. Get outside as much as you can. · Avoid using alcohol or other substances to feel better. · Get as much rest and sleep as possible. Avoid doing too much. Being too tired can increase depression. · Play stimulating music throughout your day and soothing music at night. · Schedule outings and visits with friends and family. Ask them to call you regularly, so that you do not feel alone. · Ask for help with preparing food and other daily tasks. Family and friends are often happy to help a mother with a . · Be honest with yourself and those who care about you. Tell them about your struggle. · Join a support group of new mothers. No one can better understand the challenges of caring for a  than other new mothers. · If you feel like life is not worth living or are feeling hopeless, get help right away. Keep the numbers for these national suicide hotlines: -TALK (2-848.348.9117) and 8-012-ABANLEI (9-501.821.1240). When should you call for help? Call 911 anytime you think you may need emergency care. For example, call if:    · You feel you cannot stop from hurting yourself, your baby, or someone else. Call your doctor now or seek immediate medical care if:    · You are having trouble caring for yourself or your baby.     · You hear voices.    Watch closely for changes in your health, and be sure to contact your doctor if:    · You have problems with your depression medicine.     · You do not get better as expected. Where can you learn more? Go to http://www.gray.com/  Enter [id number] in the search box to learn more about \"Depression After Childbirth: Care Instructions. \"  Current as of: 2020               Content Version: 12.6   [employer]. Care instructions adapted under license by [employer] (which disclaims liability or warranty for this information). If you have questions about a medical condition or this instruction, always ask your healthcare professional. [place] [address] any warranty or liability for your use of this information. Bottle-Feeding: Care Instructions  Overview    Your reasons for wanting to bottle-feed your baby with formula are personal. You and your partner can make the best decision for you and your baby. Formulas can provide all the calories and nutrients your baby needs in the first 6 months of life. Several types of formulas are available. Most babies start with a cow's milk-based formula. Talk to your doctor before trying other types of formulas, which include soy and lactose-free formulas. At first, preparing the bottles and formula can seem confusing, but it gets easier and faster with some practice. Your  baby probably will want to eat every 2 to 3 hours. Do not worry about the exact timing for the first few weeks, but feed your baby whenever he or she is hungry. In general, your baby should not go longer than 4 hours without eating during the day for the first few months. Sit in a comfortable chair with your arms supported on pillows. Look into your baby's eyes and talk or sing while you are giving the bottle. Enjoy this special time you have with your baby. Follow-up care is a key part of your child's treatment and safety.  Be sure to make and go to all appointments, and call your doctor if your child is having problems. It's also a good idea to know your child's test results and keep a list of the medicines your child takes. At each well-baby visit, talk to your doctor about your baby's nutritional needs, which change as he or she grows and develops. How can you care for yourself at home? · Prepare your supplies for bottle-feeding before your baby is born, if possible. ? Have a supply of small bottles (usually 4 ounces) for your baby's first few weeks. ? You may want to buy a variety of bottle nipples so you can see which type your baby likes. ? Before using bottles and nipples the first time, wash them in hot water and dish soap and rinse with hot water. · Ask your doctor which formula to use. You can buy formula as a liquid concentrate or a powder that you mix with water. Formulas also come in a ready-to-feed form. Always use formula with added iron unless the doctor says not to. · Make sure you have clean, safe water to mix with the formula. If you are not sure if your water is safe, you can use bottled water or you can boil tap water. ? Boil cold tap water for 1 minute, then cool the water to room temperature. ? Use the boiled water to mix the formula within 30 minutes. · Wash your hands before preparing formula. · Read the label to see how much water to mix with the formula. If you add too little water, it can upset your baby's stomach. If you add too much water, your baby will not get the right nutrition. · Cover the prepared formula and store it in a refrigerator. Use it within 24 hours. · Soak dirty baby bottles in water and dish soap. Wash bottles and nipples in the upper rack of the  or hand-wash them in hot water with dish soap. To bottle-feed your baby  · Warm the formula to room temperature or body temperature before feeding. The best way to warm it is in a bowl of heated water.  Do not use a microwave, which can cause hot spots in the formula that can burn your baby's mouth. · Before feeding your baby, check the temperature of the formula by dripping 2 or 3 drops on the inside of your wrist. It should be warm, not cold or hot. · Place a bib or cloth under your baby's chin to help keep clothes clean. Have a second cloth handy to use when burping your baby. · Support your baby with one arm, with your baby's head resting in the bend of your elbow. Keep your baby's head higher than his or her chest.  · Stroke the center of your baby's lower lip to encourage the mouth to open wider. A wide mouth will cover more of the nipple and will help reduce the amount of air the baby sucks in. · Angle the bottle so the neck of the bottle and the nipple stay full of milk. This helps reduce how much air your baby swallows. · Do not prop the bottle in your baby's mouth or let him or her hold it alone. This increases your baby's chances of choking or getting ear infections. · During the first few weeks, burp your baby after every 2 ounces of formula. This helps get rid of swallowed air and reduces spitting up. · You will know your baby is full when he or she stops sucking. Your baby may spit out the nipple, turn his or her head away, or fall asleep when full.  babies usually drink from 1 to 3 ounces each feeding. · Throw away any formula left in the bottle after you have fed your baby. Bacteria can grow in the leftover formula. · It can be helpful to hold your baby upright for about 30 minutes after eating to reduce spitting up. When should you call for help?   Watch closely for changes in your child's health, and be sure to contact your doctor if:    · Your child does not seem to be growing and gaining weight.     · Your child has trouble passing stools, or his or her stools are hard and dry.     · Your child is vomiting.     · Your child has diarrhea or a skin rash.     · Your child cries most of the time.     · Your child has gas, bloating, or cramps after drinking a bottle. Where can you learn more? Go to http://www.gray.com/  Enter P111 in the search box to learn more about \"Bottle-Feeding: Care Instructions. \"  Current as of: August 22, 2019               Content Version: 12.6  © 4346-1890 [employer]. Care instructions adapted under license by [employer] (which disclaims liability or warranty for this information). If you have questions about a medical condition or this instruction, always ask your healthcare professional. [address] any warranty or liability for your use of this information.

## 2020-12-05 NOTE — PROGRESS NOTES
Progress Note    Patient: Mary Valdivia MRN: [id number]  SSN: [id number]    YOB: 2000  Age: 23 y.o. Sex: female      Admit Date: 12/3/2020    LOS: 2 days     Subjective:     Patient is without complaints, she reports minimal lochia, she is tolerating a regular diet    Objective:     Vitals:    20 1456 20 2010 20 2356 20 0915   BP: 136/70 123/64 129/78 122/68   Pulse: 98 80 85 95   Resp: 18 18 18 18   Temp: 98.2 °F (36.8 °C) 97.8 °F (36.6 °C) 98.3 °F (36.8 °C) 98.7 °F (37.1 °C)   SpO2: 99% 100% 98% 99%   Weight:       Height:            Physical Exam:   Heart is with regular rate and rhythm  Lungs are clear to auscultation  Fundus is below umbilicus  Wound is clean dry and intact  Extremities are without clubbing cyanosis or edema    Lab/Data Review:  No new labs    Assessment:   Postoperative day #2 status post low transverse  section, stable.   Principal Problem:    Pregnancy with 37 weeks completed gestation (12/3/2020)    Active Problems:    HIV infection ([address] (2020)      History of  delivery (8/10/2020)      Oligohydramnios in third trimester (12/3/2020)        Plan:     Discharged home today    Signed By: Mary Beth Llanes MD     2020

## 2020-12-05 NOTE — PROGRESS NOTES
2010: VS obtained and shift assessment completed. Pt reminded to wear SCDs to BLE when in bed. Pt voiced understanding. No needs expressed. 2108: Rounded. Pt in bathroom. No needs expressed. 2133: Scheduled Motrin and other scheduled medications given. See MAR; pt states passing flatus now. No needs expressed. 2241: Rounded. Scheduled Lovenox 40 mg administered in RLQ of abdomen and pt educated. Pt states doing ok and denies needing anything. 2356: Rounded. VS obtained. Fundus, lochia, and abdominal incision reassessed. IV lock to right hand flushed with 10 ml normal saline. Ice given per request. No other needs at this time. Pt. Informed writer will round every 2 hours after 11 pm.     0158: Rounded. Pt lying in bed with eyes closed. RR even and unlabored. 0321: Rounded. Pt sitting up in bed crying and c/o incisional pain rated 7/10. Pt medicated with Percocet; see MAR. No other needs expressed. 9686: Rounded. Pt medicated with scheduled Motrin. Pt c/o itching and Benadryl administered IV push. See MAR. No other needs expressed. 8980: Rounded with shift report. Report given to GEOVANY Carpenter RN.

## 2020-12-05 NOTE — PROGRESS NOTES
9642 Discharge plan of care/case management plan validated with provider discharge order. Discharge instructions given to pt with verbal understanding. Prescriptions at pharmacy on file . Discussed  When to take medication and side effects discussed follow up appointments when,wear ,time, location, wear mask, and come alone. Discussed post partum warning signs. When, what, and how to notify provider. Discussed pelvic rest for 6 weeks. Discussed post partum baby blues,depression ,and anxiety. Denies depression at this time. discussed incisional care. no questions. Voiced understanding. 1500 pt discharged via wheelchair with belongings. Pt secured baby into infant car seat and sat pt on lap until wheeled to car. Pt secured infant car seat into car using car seat belt.

## 2020-12-09 ENCOUNTER — OFFICE VISIT (OUTPATIENT)
Dept: OBGYN CLINIC | Age: 20
End: 2020-12-09
Payer: MEDICAID

## 2020-12-09 VITALS — BODY MASS INDEX: 42.04 KG/M2 | SYSTOLIC BLOOD PRESSURE: 149 MMHG | DIASTOLIC BLOOD PRESSURE: 99 MMHG | WEIGHT: 293 LBS

## 2020-12-09 DIAGNOSIS — Z98.891 S/P CESAREAN SECTION: ICD-10-CM

## 2020-12-09 DIAGNOSIS — Z48.89 POSTOPERATIVE VISIT: Primary | ICD-10-CM

## 2020-12-09 PROBLEM — Z3A.37 PREGNANCY WITH 37 WEEKS COMPLETED GESTATION: Status: RESOLVED | Noted: 2020-12-03 | Resolved: 2020-12-09

## 2020-12-09 PROCEDURE — 99024 POSTOP FOLLOW-UP VISIT: CPT | Performed by: OBSTETRICS & GYNECOLOGY

## 2020-12-09 NOTE — PROGRESS NOTES
Clotilde Gonzalez is a 23 y.o. female who presents today for the following:  Chief Complaint   Patient presents with   [address]      12/3/2020; c/o pain at incision site        Allergies   Allergen Reactions    Keflex [Cephalexin] Hives       Current Outpatient Medications   Medication Sig    ibuprofen (MOTRIN) 800 mg tablet Take 1 Tab by mouth every eight (8) hours.  oxyCODONE-acetaminophen (PERCOCET) 5-325 mg per tablet Take 1 Tab by mouth every four (4) hours as needed for Pain for up to 5 days. Max Daily Amount: 6 Tabs.  prenatal HABV33/AJCR fum/folic (prenatal vitamin) 27 mg iron- 800 mcg tab tablet     raltegravir (ISENTRESS) 400 mg tablet Take 1 Tab by mouth two (2) times a day. No current facility-administered medications for this visit.         Past Medical History:   Diagnosis Date    Anxiety and depression     Anxiety disorder     Asthma     History of trichomonal vaginitis     [facility]     Obesity     Obesity     Sickle cell anemia (HCC)     Sickle cell trait ([facility]     FOB neg       Past Surgical History:   Procedure Laterality Date    HX  SECTION              Family History   Problem Relation Age of Onset    Diabetes Father        Social History     Socioeconomic History    Marital status: SINGLE     Spouse name: Not on file    Number of children: 1    Years of education: Not on file    Highest education level: 11th grade   Occupational History    Not on file   Social Needs    Financial resource strain: Not hard at all   [place] insecurity     Worry: Never true     Inability: Never true   [employer] needs     Medical: No     Non-medical: No   Tobacco Use    Smoking status: Never Smoker    Smokeless tobacco: Never Used   Substance and Sexual Activity    Alcohol use: Not Currently    Drug use: Never    Sexual activity: Yes     Partners: Male   Lifestyle    Physical activity     Days per week: 0 days     Minutes per session: Not on file    Stress: Only a little   Relationships    Social connections     Talks on phone: More than three times a week     Gets together: More than three times a week     Attends Shinto service: Never     Active member of club or organization: No     Attends meetings of clubs or organizations: Never     Relationship status: Never     Intimate partner violence     Fear of current or ex partner: No     Emotionally abused: No     Physically abused: No     Forced sexual activity: No   Other Topics Concern     Service Not Asked    Blood Transfusions Not Asked    Caffeine Concern Not Asked    Occupational Exposure Not Asked    Hobby Hazards Not Asked    Sleep Concern Not Asked    Stress Concern Not Asked    Weight Concern Not Asked    Special Diet Not Asked    Back Care Not Asked    Exercise Not Asked    Bike Helmet Not Asked    [address] Not Asked    Self-Exams Not Asked   Social History Narrative    Not on file         HPI  Here today for postoperative follow-up. She had a repeat  section. She is not breast-feeding. ROS   Review of Systems   Constitutional: Negative. HENT: Negative. Eyes: Negative. Respiratory: Negative. Cardiovascular: Negative. Gastrointestinal: Negative. Genitourinary: Negative. Musculoskeletal: Negative. Skin: Negative. Neurological: Negative. Endo/Heme/Allergies: Negative. Psychiatric/Behavioral: Negative.       BP (!) 149/99 (BP 1 Location: Left arm, BP Patient Position: Sitting)   Wt 293 lb (132.9 kg)   BMI 42.04 kg/m²    OBGyn Exam   Constitutional  · Appearance: well-nourished, well developed, alert, in no acute distress    HENT  · Head and Face: appears normal    Neck  · Inspection/Palpation: normal appearance, no masses or tenderness  · Lymph Nodes: no lymphadenopathy present  · Thyroid: gland size normal, nontender, no nodules or masses present on palpation    Breasts   Symmetric, no palpable masses, no tenderness, no skin changes, no nipple abnormality, no nipple discharge, no lymphadenopathy. Chest  · Respiratory Effort: breathing labored  · Auscultation: normal breath sounds    Cardiovascular  · Heart:  · Auscultation: regular rate and rhythm without murmur    Gastrointestinal  · Abdominal Examination: abdomen non-tender to palpation, normal bowel sounds, no masses present. Incision: Adequate healing noted, no discharge, no erythema, no signs of infection. · Liver and spleen: no hepatomegaly present, spleen not palpable  · Hernias: no hernias identified    Skin  · General Inspection: no rash, no lesions identified    Neurologic/Psychiatric  · Mental Status:  · Orientation: grossly oriented to person, place and time  · Mood and Affect: mood normal, affect appropriate    No results found for this visit on 20. No orders of the defined types were placed in this encounter. 1. Postoperative visit      2. S/P  section        Follow-up and Dispositions    · Return in about 4 weeks (around 2021) for Postpartum visit.

## 2021-01-05 DIAGNOSIS — Z21 ASYMPTOMATIC HIV INFECTION (HCC): Primary | ICD-10-CM

## 2021-01-05 RX ORDER — EMTRICITABINE AND TENOFOVIR ALAFENAMIDE 200; 25 MG/1; MG/1
1 TABLET ORAL DAILY
Qty: 30 TAB | Refills: 3 | Status: SHIPPED | OUTPATIENT
Start: 2021-01-05

## 2021-02-24 ENCOUNTER — TELEPHONE (OUTPATIENT)
Dept: INFECTIOUS DISEASES | Age: 21
End: 2021-02-24

## 2021-02-24 NOTE — TELEPHONE ENCOUNTER
----- Message from Lise Magana MD sent at 2/16/2021  5:26 PM EST -----  Please follow up w pt to see if still taking HIV meds  I have not seen or heard from her since she gave birth   Ensure she has a scheduled apt   Thanks!

## 2021-02-24 NOTE — TELEPHONE ENCOUNTER
Phone call to patient several times on Feb 16,17,22,24, no answer. I left  asking for a call back at my extension [phone].  The patient has yet to respond

## 2021-03-02 NOTE — TELEPHONE ENCOUNTER
Phone call to patient yesterday and today left message to call me back.  I also called boyfriend,s number and left message on his VM

## 2021-03-08 NOTE — TELEPHONE ENCOUNTER
Patient called back and left another number for us to call Friday morning . I gave the number to Dr Ivania Lopez and she will call the patient.

## 2021-04-01 ENCOUNTER — VIRTUAL VISIT (OUTPATIENT)
Dept: INFECTIOUS DISEASES | Age: 21
End: 2021-04-01
Payer: MEDICAID

## 2021-04-01 DIAGNOSIS — Z21 ASYMPTOMATIC HIV INFECTION, WITH NO HISTORY OF HIV-RELATED ILLNESS (HCC): Primary | ICD-10-CM

## 2021-04-01 PROCEDURE — 99442 PR PHYS/QHP TELEPHONE EVALUATION 11-20 MIN: CPT | Performed by: INTERNAL MEDICINE

## 2021-04-01 NOTE — PROGRESS NOTES
Subjective  Manford Nixon    HPI  I had a virtual encounter w Ms Elaine Valdivia per her request for a f/u of her HIV infection. She was diagnosed in 2020 while pregnant and baseline labs in 2020 showed a T cell count of 583 (32.4%) and 15,700 copies in 2020. She has been on Descovy and isentress since 2020, plan was to transition to [facility] after her delivery but she has not followed up with me. She admits to 100% compliance w her cART, denies missed doses or adverse side effects. Most recent labs in 10/2020 showed a T cell count of 593 (34.9%) and an HIV RNA VL of < 20 copies per ml. She is interested in once daily pill regimen, but wants to complete current refill of Descovy and Isentress. She has come to terms w HIV diagnoses and has a supportive fmly. He denied acute complaints on assessment today. ROS  Review of Systems   All other systems reviewed and are negative. Past Medical History:   Diagnosis Date    Anxiety and depression     Anxiety disorder     Asthma     History of trichomonal vaginitis     [facility]     Obesity     Obesity     Sickle cell anemia (HCC)     Sickle cell trait ([facility]     FOB neg        Past Surgical History:   Procedure Laterality Date    HX  SECTION               Social History     Tobacco Use    Smoking status: Never Smoker    Smokeless tobacco: Never Used   Substance Use Topics    Alcohol use: Not Currently    Drug use: Never        Family History   Problem Relation Age of Onset    Diabetes Father         Allergies   Allergen Reactions    Cat Dander Hives    Keflex [Cephalexin] Hives        Objective  Physical Exam: Could not be performed vitually     Assessment & Plan  Diagnoses and all orders for this visit:    1.  Asymptomatic HIV infection, with no history of HIV-related illness ([facility]  -     HIV-1 RNA QT BY PCR  -     LYMPHOCYTES, CD4 PERCENT AND ABSOLUTE  -     RPR  -     METABOLIC PANEL, COMPREHENSIVE  Controlled w an undetectable HIV RNA VL on most recent labs 10/2020  Repeat labs ordered   Plan on simplifying regimen w Biktarvy   Re-emphasized compliance w cART   Will reassess in 6 months     Visit was conducted virtually over the phone after obtaining consent from patient. Her phone did not support use of Doxy. me     About 11 to 20 minutes was spent in discussing pts condition and plan of care

## 2021-04-02 ENCOUNTER — TELEPHONE (OUTPATIENT)
Dept: INFECTIOUS DISEASES | Age: 21
End: 2021-04-02

## 2021-04-02 DIAGNOSIS — Z21 ASYMPTOMATIC HIV INFECTION, WITH NO HISTORY OF HIV-RELATED ILLNESS (HCC): Primary | ICD-10-CM

## 2021-04-02 RX ORDER — BICTEGRAVIR SODIUM, EMTRICITABINE, AND TENOFOVIR ALAFENAMIDE FUMARATE 50; 200; 25 MG/1; MG/1; MG/1
1 TABLET ORAL DAILY
Qty: 30 TAB | Refills: 5 | Status: SHIPPED | OUTPATIENT
Start: 2021-04-02 | End: 2021-05-02

## 2021-06-19 ENCOUNTER — HOSPITAL ENCOUNTER (EMERGENCY)
Age: 21
Discharge: HOME | End: 2021-06-19
Attending: FAMILY MEDICINE
Payer: MEDICAID

## 2021-06-19 VITALS
HEART RATE: 80 BPM | HEIGHT: 71 IN | BODY MASS INDEX: 40.04 KG/M2 | RESPIRATION RATE: 16 BRPM | SYSTOLIC BLOOD PRESSURE: 131 MMHG | OXYGEN SATURATION: 99 % | WEIGHT: 286 LBS | TEMPERATURE: 98.8 F | DIASTOLIC BLOOD PRESSURE: 82 MMHG

## 2021-06-19 DIAGNOSIS — Z32.02 PREGNANCY TEST NEGATIVE: Primary | ICD-10-CM

## 2021-06-19 DIAGNOSIS — N92.6 ABNORMAL MENSES: ICD-10-CM

## 2021-06-19 LAB — HCG SERPL QL: NEGATIVE

## 2021-06-19 PROCEDURE — 36415 COLL VENOUS BLD VENIPUNCTURE: CPT

## 2021-06-19 PROCEDURE — 99282 EMERGENCY DEPT VISIT SF MDM: CPT

## 2021-06-19 PROCEDURE — 84703 CHORIONIC GONADOTROPIN ASSAY: CPT

## 2021-06-19 NOTE — ED TRIAGE NOTES
Pt states that she is on a medication (biktarvy)  that she can't take if she is pregnant. She states that her period is a week and a half late. Has taken several at home tests and all are negative. XR at bedside     Puneet Gonzales, ADRIEL  10/20/18 2661

## 2021-06-19 NOTE — ED PROVIDER NOTES
EMERGENCY DEPARTMENT HISTORY AND PHYSICAL EXAM      Date: 6/19/2021  Patient Name: [place]    History of Presenting Illness     Chief Complaint   Patient presents with    Missed Menses       History Provided By: Patient    HPI: [place], 23 y.o. female with a past medical history as documented below, presents to the ED with cc of concerned she may be pregnant. She is currently taking medication at she cannot take if she is pregnant. She missed her period this month. The last one was May 10. She has taken several home pregnancy test and all came back negative but she does have a history of false negative urine pregnancy test she was caring at 3 months gestation. She is here for a serum pregnancy test.  No abdominal pain or vaginal bleeding. There are no other complaints, changes, or physical findings at this time. PCP: None    No current facility-administered medications on file prior to encounter. Current Outpatient Medications on File Prior to Encounter   Medication Sig Dispense Refill    zcmywssdq-ubilptak-gbljvjc ala (BIKTARVY) tab tablet Take 1 Tablet by mouth daily.  emtricitabine-tenofovir alafen (Descovy) tablet Take 1 Tab by mouth daily. (Patient not taking: Reported on 6/19/2021) 30 Tab 3    raltegravir (ISENTRESS) 400 mg tablet Take 1 Tab by mouth two (2) times a day. (Patient not taking: Reported on 6/19/2021) 60 Tab 3    ibuprofen (MOTRIN) 800 mg tablet Take 1 Tab by mouth every eight (8) hours. (Patient not taking: Reported on 6/19/2021) 40 Tab 0    prenatal ETKM80/KEFJ fum/folic (prenatal vitamin) 27 mg iron- 800 mcg tab tablet       raltegravir (ISENTRESS) 400 mg tablet Take 1 Tab by mouth two (2) times a day.  (Patient not taking: Reported on 6/19/2021) 60 Tab 3       Past History     Past Medical History:  Past Medical History:   Diagnosis Date    Anxiety and depression     Anxiety disorder     Asthma     History of trichomonal vaginitis     HIV (human immunodeficiency virus infection) ([facility]     Obesity     Obesity     Sickle cell anemia (HCC)     Sickle cell trait ([facility]     FOB neg       Past Surgical History:  Past Surgical History:   Procedure Laterality Date    HX  SECTION              Family History:  Family History   Problem Relation Age of Onset    Diabetes Father        Social History:  Social History     Tobacco Use    Smoking status: Never Smoker    Smokeless tobacco: Never Used   Vaping Use    Vaping Use: Never used   Substance Use Topics    Alcohol use: Not Currently    Drug use: Never       Allergies: Allergies   Allergen Reactions    Cat Dander Hives    Keflex [Cephalexin] Hives         Review of Systems     Review of Systems   Constitutional: Negative for chills and fever. HENT: Negative for congestion and sore throat. Eyes: Negative for photophobia and visual disturbance. Respiratory: Negative for cough and shortness of breath. Cardiovascular: Negative for chest pain and palpitations. Gastrointestinal: Negative for nausea and vomiting. Genitourinary: Positive for menstrual problem. Negative for dysuria and flank pain. Musculoskeletal: Negative for arthralgias, back pain and myalgias. Skin: Negative for rash and wound. Allergic/Immunologic: Negative for environmental allergies and food allergies. Neurological: Negative for light-headedness and headaches. All other systems reviewed and are negative. Physical Exam     Physical Exam  Vitals and nursing note reviewed. Constitutional:       Appearance: Normal appearance. She is obese. HENT:      Head: Normocephalic and atraumatic. Eyes:      Extraocular Movements: Extraocular movements intact. Conjunctiva/sclera: Conjunctivae normal.   Cardiovascular:      Rate and Rhythm: Normal rate and regular rhythm. Pulses: Normal pulses. Heart sounds: Normal heart sounds.    Pulmonary:      Effort: Pulmonary effort is normal.      Breath sounds: Normal breath sounds. Abdominal:      General: Abdomen is flat. Bowel sounds are normal.      Palpations: Abdomen is soft. Tenderness: There is no abdominal tenderness. Musculoskeletal:         General: No swelling. Normal range of motion. Skin:     General: Skin is warm. Capillary Refill: Capillary refill takes less than 2 seconds. Neurological:      General: No focal deficit present. Mental Status: She is alert and oriented to person, place, and time. Psychiatric:         Mood and Affect: Mood normal.         Behavior: Behavior normal.         Thought Content: Thought content normal.         Judgment: Judgment normal.         Lab and Diagnostic Study Results     Labs -     Recent Results (from the past 12 hour(s))   HCG QL SERUM    Collection Time: 06/19/21 11:17 AM   Result Value Ref Range    HCG, Ql. Negative Negative         Radiologic Studies -   @lastxrresult@  CT Results  (Last 48 hours)    None        CXR Results  (Last 48 hours)    None            Medical Decision Making   - I am the first provider for this patient. - I reviewed the vital signs, available nursing notes, past medical history, past surgical history, family history and social history. - Initial assessment performed. The patients presenting problems have been discussed, and they are in agreement with the care plan formulated and outlined with them. I have encouraged them to ask questions as they arise throughout their visit. Vital Signs-Reviewed the patient's vital signs. Patient Vitals for the past 12 hrs:   Temp Pulse Resp BP SpO2   06/19/21 1038 98.8 °F (37.1 °C) 80 16 131/82 99 %       Records Reviewed: Nursing Notes and Old Medical Records    ED Course:          Provider Notes (Medical Decision Making):     MDM  Number of Diagnoses or Management Options  Abnormal menses  Pregnancy test negative  Diagnosis management comments: 11:49 AM  Patient is stable with no marked toxicity or distress.  No significant findings on physical exam. I reviewed all laboratory results with the patient. All questions were answered and there are no apparent barriers to comprehension or communication. The patient verbalized agreement with the diagnosis, treatment plan, and understanding of the follow-up instructions. The patient is appropriate for discharge; leaves the Emergency Department walking with a stable gait. Patient understands to return to the ED for any new or worsening symptoms. Disposition   Disposition: Condition stable  DC- Adult Discharges: All of the diagnostic tests were reviewed and questions answered. Diagnosis, care plan and treatment options were discussed. The patient understands the instructions and will follow up as directed. The patients results have been reviewed with them. They have been counseled regarding their diagnosis. The patient verbally convey understanding and agreement of the signs, symptoms, diagnosis, treatment and prognosis and additionally agrees to follow up as recommended with their PCP in 24 - 48 hours. They also agree with the care-plan and convey that all of their questions have been answered. I have also put together some discharge instructions for them that include: 1) educational information regarding their diagnosis, 2) how to care for their diagnosis at home, as well a 3) list of reasons why they would want to return to the ED prior to their follow-up appointment, should their condition change. DISCHARGE PLAN:  1. Current Discharge Medication List      CONTINUE these medications which have NOT CHANGED    Details   vgwntrrkt-xurhaiib-eqxosnl ala (BIKTARVY) tab tablet Take 1 Tablet by mouth daily. emtricitabine-tenofovir alafen (Descovy) tablet Take 1 Tab by mouth daily. Qty: 30 Tab, Refills: 3    Associated Diagnoses: Asymptomatic [facility] infection ([facility]      ! ! raltegravir (ISENTRESS) 400 mg tablet Take 1 Tab by mouth two (2) times a day.   Qty: 60 Tab, Refills: 3    Associated Diagnoses: Asymptomatic HIV infection (HCC)      ibuprofen (MOTRIN) 800 mg tablet Take 1 Tab by mouth every eight (8) hours. Qty: 40 Tab, Refills: 0      prenatal PHAJ37/VPJV fum/folic (prenatal vitamin) 27 mg iron- 800 mcg tab tablet       !! raltegravir (ISENTRESS) 400 mg tablet Take 1 Tab by mouth two (2) times a day. Qty: 60 Tab, Refills: 3    Associated Diagnoses: Asymptomatic HIV infection ([facility]       ! ! - Potential duplicate medications found. Please discuss with provider. 2.   Follow-up Information     Follow up With Specialties Details Why [facility] in 3 days As needed [address] [place] [address]  [phone]        3. Return to ED if worse   4. Current Discharge Medication List            Diagnosis     Clinical Impression:   1. Pregnancy test negative    2. Abnormal menses        Attestations:    Michelle Morgan, DO    Please note that this dictation was completed with [employer], the computer voice recognition software. Quite often unanticipated grammatical, syntax, homophones, and other interpretive errors are inadvertently transcribed by the computer software. Please disregard these errors. Please excuse any errors that have escaped final proofreading. Thank you.

## 2021-06-19 NOTE — DISCHARGE INSTRUCTIONS
Thank you! Thank you for allowing me to care for you in the emergency department. I sincerely hope that you are satisfied with your visit today. It is my goal to provide you with excellent care. Below you will find a list of your labs and imaging from your visit today. Should you have any questions regarding these results please do not hesitate to call the emergency department. Labs -     Recent Results (from the past 12 hour(s))   HCG QL SERUM    Collection Time: 06/19/21 11:17 AM   Result Value Ref Range    HCG, Ql. Negative Negative         Radiologic Studies -   No orders to display     CT Results  (Last 48 hours)      None          CXR Results  (Last 48 hours)      None               If you feel that you have not received excellent quality care or timely care, please ask to speak to the nurse manager. Please choose us in the future for your continued health care needs. ------------------------------------------------------------------------------------------------------------  The exam and treatment you received in the Emergency Department were for an urgent problem and are not intended as complete care. It is important that you follow-up with a doctor, nurse practitioner, or physician assistant to:  (1) confirm your diagnosis,  (2) re-evaluation of changes in your illness and treatment, and  (3) for ongoing care. If your symptoms become worse or you do not improve as expected and you are unable to reach your usual health care provider, you should return to the Emergency Department. We are available 24 hours a day. Please take your discharge instructions with you when you go to your follow-up appointment. If you have any problem arranging a follow-up appointment, contact the Emergency Department immediately. If a prescription has been provided, please have it filled as soon as possible to prevent a delay in treatment.  Read the entire medication instruction sheet provided to you by the pharmacy. If you have any questions or reservations about taking the medication due to side effects or interactions with other medications, please call your primary care physician or contact the ER to speak with the charge nurse. Make an appointment with your family doctor or the physician you were referred to for follow-up of this visit as instructed on your discharge paperwork, as this is a mandatory follow-up. Return to the ER if you are unable to be seen or if you are unable to be seen in a timely manner. If you have any problem arranging the follow-up visit, contact the Emergency Department immediately.

## 2021-09-15 ENCOUNTER — HOSPITAL ENCOUNTER (EMERGENCY)
Age: 21
Discharge: LEFT BEFORE DISCHARGE | End: 2021-09-15
Payer: MEDICAID

## 2021-09-15 ENCOUNTER — HOSPITAL ENCOUNTER (EMERGENCY)
Age: 21
Discharge: HOME | End: 2021-09-15
Attending: EMERGENCY MEDICINE
Payer: MEDICAID

## 2021-09-15 VITALS
DIASTOLIC BLOOD PRESSURE: 96 MMHG | RESPIRATION RATE: 20 BRPM | BODY MASS INDEX: 38.5 KG/M2 | HEIGHT: 71 IN | TEMPERATURE: 98.2 F | SYSTOLIC BLOOD PRESSURE: 139 MMHG | WEIGHT: 275 LBS | OXYGEN SATURATION: 100 % | HEART RATE: 85 BPM

## 2021-09-15 VITALS
HEART RATE: 92 BPM | SYSTOLIC BLOOD PRESSURE: 151 MMHG | TEMPERATURE: 98.2 F | WEIGHT: 274 LBS | RESPIRATION RATE: 20 BRPM | HEIGHT: 71 IN | DIASTOLIC BLOOD PRESSURE: 84 MMHG | BODY MASS INDEX: 38.36 KG/M2 | OXYGEN SATURATION: 98 %

## 2021-09-15 DIAGNOSIS — R13.10 DYSPHAGIA, UNSPECIFIED TYPE: Primary | ICD-10-CM

## 2021-09-15 LAB
APPEARANCE UR: CLEAR
BILIRUB UR QL: NEGATIVE
COLOR UR: YELLOW
GLUCOSE UR STRIP.AUTO-MCNC: NORMAL MG/DL
HCG UR QL: NEGATIVE
HGB UR QL STRIP: NEGATIVE
KETONES UR QL STRIP.AUTO: NEGATIVE MG/DL
LEUKOCYTE ESTERASE UR QL STRIP.AUTO: NEGATIVE
NITRITE UR QL STRIP.AUTO: NEGATIVE
PH UR STRIP: 5 [PH] (ref 5–8)
PROT UR STRIP-MCNC: NEGATIVE MG/DL
SP GR UR REFRACTOMETRY: 1.01 (ref 1–1.03)
UROBILINOGEN UR QL STRIP.AUTO: NORMAL EU/DL (ref 0.2–1)

## 2021-09-15 PROCEDURE — 93005 ELECTROCARDIOGRAM TRACING: CPT

## 2021-09-15 PROCEDURE — 99284 EMERGENCY DEPT VISIT MOD MDM: CPT

## 2021-09-15 PROCEDURE — 74011250637 HC RX REV CODE- 250/637: Performed by: EMERGENCY MEDICINE

## 2021-09-15 PROCEDURE — 75810000275 HC EMERGENCY DEPT VISIT NO LEVEL OF CARE

## 2021-09-15 PROCEDURE — 81003 URINALYSIS AUTO W/O SCOPE: CPT

## 2021-09-15 PROCEDURE — 81025 URINE PREGNANCY TEST: CPT

## 2021-09-15 RX ORDER — ACETAMINOPHEN 500 MG
1000 TABLET ORAL
Status: COMPLETED | OUTPATIENT
Start: 2021-09-15 | End: 2021-09-15

## 2021-09-15 RX ADMIN — ACETAMINOPHEN 1000 MG: 500 TABLET ORAL at 21:16

## 2021-09-15 NOTE — LETTER
NOTIFICATION RETURN TO WORK / SCHOOL    9/15/2021 9:19 PM    Ms. Eugenia Regalado  [address] [id number]      To Whom It May Concern:    Eugenia Regalado is currently under the care of [address] [place] [address]. She will return to work/school on: 09/16/2021    If there are questions or concerns please have the patient contact our office.         Sincerely,      Kajal Laura RN

## 2021-09-15 NOTE — LETTER
Kelvin Flores accompanied Matthew  to the emergency department on 9/15/2021. They may return to work on 09/16/2021. If you have any questions or concerns, please don't hesitate to call.       Mike Khan RN

## 2021-09-15 NOTE — ED TRIAGE NOTES
Pt states she feels like her throat is tight and is having intermittent chest pain since this morning.

## 2021-09-15 NOTE — ED TRIAGE NOTES
Patient reports that she was eating dinner last night, felt as if something got stuck in her throat. Woke up this morning with a tightness and soreness in her throat. Patient able to handle own secretions, talk normally.

## 2021-09-16 LAB
ATRIAL RATE: 79 BPM
CALCULATED P AXIS, ECG09: 12 DEGREES
CALCULATED R AXIS, ECG10: 18 DEGREES
DIAGNOSIS, 93000: NORMAL
P-R INTERVAL, ECG05: 126 MS
Q-T INTERVAL, ECG07: 364 MS
QRS DURATION, ECG06: 88 MS
QTC CALCULATION (BEZET), ECG08: 417 MS
VENTRICULAR RATE, ECG03: 79 BPM

## 2021-09-16 NOTE — ED PROVIDER NOTES
EMERGENCY DEPARTMENT HISTORY AND PHYSICAL EXAM      Date: 9/15/2021  Patient Name: [place]    History of Presenting Illness     Chief Complaint   Patient presents with    Sore Throat       History Provided By: Patient    HPI: [place], 23 y.o. female with a past medical history significant for HIV, on antiretrovirals, states regular f/u with ID and minimal viral load presents to the ED with cc of sore throat after eating boneless chicken last night, upper chest and throat pain on awakening this am for 3 minutes. States she had significant anxiety symptoms several years ago and has increased life stressors with 10 month old with multiple medical problems. No fever, cough, congestion, sob, n/v, diaphoresis, dizziness, focal weakness or numbness. PCP: None    No current facility-administered medications on file prior to encounter. Current Outpatient Medications on File Prior to Encounter   Medication Sig Dispense Refill    vsdpkmire-mmawevqt-lznnhdb ala (BIKTARVY) tab tablet Take 1 Tablet by mouth daily.  emtricitabine-tenofovir alafen (Descovy) tablet Take 1 Tab by mouth daily. (Patient not taking: Reported on 6/19/2021) 30 Tab 3    raltegravir (ISENTRESS) 400 mg tablet Take 1 Tab by mouth two (2) times a day. (Patient not taking: Reported on 6/19/2021) 60 Tab 3    ibuprofen (MOTRIN) 800 mg tablet Take 1 Tab by mouth every eight (8) hours. (Patient not taking: Reported on 6/19/2021) 40 Tab 0    prenatal LXEU28/WTBB fum/folic (prenatal vitamin) 27 mg iron- 800 mcg tab tablet       raltegravir (ISENTRESS) 400 mg tablet Take 1 Tab by mouth two (2) times a day.  (Patient not taking: Reported on 6/19/2021) 60 Tab 3       Past History     Past Medical History:  Past Medical History:   Diagnosis Date    Anxiety and depression     Anxiety disorder     Asthma     History of trichomonal vaginitis     HIV [facility] virus infection) ([facility]     Obesity     Obesity     Sickle cell anemia (HCC)     Sickle cell trait ([facility]     FOB neg       Past Surgical History:  Past Surgical History:   Procedure Laterality Date    HX  SECTION      2015        Family History:  Family History   Problem Relation Age of Onset    Diabetes Father        Social History:  Social History     Tobacco Use    Smoking status: Never Smoker    Smokeless tobacco: Never Used   Vaping Use    Vaping Use: Never used   Substance Use Topics    Alcohol use: Not Currently    Drug use: Never       Allergies: Allergies   Allergen Reactions    Cat Dander Hives    Keflex [Cephalexin] Hives         Review of Systems   Review of Systems   Constitutional: Negative for fever. HENT: Positive for trouble swallowing. Negative for drooling. Respiratory: Negative for shortness of breath. Cardiovascular: Negative for leg swelling. Gastrointestinal: Negative for abdominal pain. Neurological: Negative for dizziness and headaches. All other systems reviewed and are negative. Physical Exam   Physical Exam  Vitals and nursing note reviewed. Constitutional:       Appearance: Normal appearance. She is not ill-appearing. HENT:      Head: Normocephalic and atraumatic. Right Ear: Tympanic membrane normal.      Left Ear: Tympanic membrane normal.      Nose: Nose normal.      Mouth/Throat:      Mouth: Mucous membranes are moist. No oral lesions. Pharynx: Uvula midline. No oropharyngeal exudate or posterior oropharyngeal erythema. Eyes:      General: No scleral icterus. Extraocular Movements: Extraocular movements intact. Pupils: Pupils are equal, round, and reactive to light. Neck:      Thyroid: No thyromegaly. Cardiovascular:      Rate and Rhythm: Normal rate and regular rhythm. Pulses: Normal pulses. Heart sounds: Normal heart sounds. Pulmonary:      Effort: Pulmonary effort is normal.      Breath sounds: Normal breath sounds. No wheezing, rhonchi or rales.    Abdominal: General: Bowel sounds are normal.      Palpations: Abdomen is soft. Tenderness: There is no abdominal tenderness. Musculoskeletal:         General: Normal range of motion. Cervical back: Normal range of motion and neck supple. Right lower leg: No edema. Left lower leg: No edema. Lymphadenopathy:      Cervical: No cervical adenopathy. Skin:     General: Skin is warm and dry. Findings: No rash. Neurological:      General: No focal deficit present. Mental Status: She is alert and oriented to person, place, and time. Comments: Nl gross motor/sensory exam without any focal or lateralizing deficits   Psychiatric:         Mood and Affect: Mood normal.         Behavior: Behavior normal.         Diagnostic Study Results     Labs -     Recent Results (from the past 12 hour(s))   HCG URINE, QL    Collection Time: 09/15/21  7:45 PM   Result Value Ref Range    HCG urine, QL Negative Negative     URINALYSIS W/ RFLX MICROSCOPIC    Collection Time: 09/15/21  7:45 PM   Result Value Ref Range    Color Yellow      Appearance Clear Clear      Specific gravity 1.010 1.003 - 1.030      pH (UA) 5.0 5.0 - 8.0      Protein Negative Negative mg/dL    Glucose Normal (A) Negative mg/dL    Ketone Negative Negative mg/dL    Bilirubin Negative Negative      Blood Negative Negative      Urobilinogen Normal 0.2 - 1.0 EU/dL    Nitrites Negative Negative      Leukocyte Esterase Negative Negative         Radiologic Studies -   No orders to display     CT Results  (Last 48 hours)    None        CXR Results  (Last 48 hours)    None            Medical Decision Making   I am the first provider for this patient. I reviewed the vital signs, available nursing notes, past medical history, past surgical history, family history and social history. Vital Signs-Reviewed the patient's vital signs.   Patient Vitals for the past 12 hrs:   Temp Pulse Resp BP SpO2   09/15/21 1935 98.2 °F (36.8 °C) 85 20 (!) 139/96 100 % Records Reviewed: Nursing Notes    Provider Notes (Medical Decision Making):   Diff Dx: pharyngitis, anxiety      ED Course:   Initial assessment performed. The patients presenting problems have been discussed, and they are in agreement with the care plan formulated and outlined with them. I have encouraged them to ask questions as they arise throughout their visit. EKG interpretation: (Preliminary)  Rhythm: normal sinus rhythm; and regular . Rate (approx.): 79; Axis: normal; P wave: normal; QRS interval: normal ; ST/T wave: normal; Other findings: normal ECG per EP reading. Po tylenol for pain, pt reassured of nl exam and ecg. Discussed f/u with PCP and s/sx for which to return. PLAN:  1. Discharge Medication List as of 9/15/2021  9:17 PM        2. Follow-up Information     Follow up With Specialties Details Why [address]    [address] [facility]    Leopold Motto, [address]   [address] 150[address] [facility]  [phone]          Return to ED if worse     Diagnosis     Clinical Impression:   1.  Dysphagia, unspecified type

## 2021-10-05 ENCOUNTER — VIRTUAL VISIT (OUTPATIENT)
Dept: INFECTIOUS DISEASES | Age: 21
End: 2021-10-05
Payer: MEDICAID

## 2021-10-05 DIAGNOSIS — B20 HIV INFECTION, UNSPECIFIED SYMPTOM STATUS (HCC): Primary | ICD-10-CM

## 2021-10-05 DIAGNOSIS — G47.00 INSOMNIA, UNSPECIFIED TYPE: ICD-10-CM

## 2021-10-05 DIAGNOSIS — R63.5 WEIGHT GAIN: ICD-10-CM

## 2021-10-05 DIAGNOSIS — B20 HIV INFECTION, UNSPECIFIED SYMPTOM STATUS (HCC): ICD-10-CM

## 2021-10-05 PROCEDURE — 99213 OFFICE O/P EST LOW 20 MIN: CPT | Performed by: INTERNAL MEDICINE

## 2021-10-05 RX ORDER — BICTEGRAVIR SODIUM, EMTRICITABINE, AND TENOFOVIR ALAFENAMIDE FUMARATE 50; 200; 25 MG/1; MG/1; MG/1
1 TABLET ORAL DAILY
Qty: 30 TABLET | Refills: 5 | Status: SHIPPED | OUTPATIENT
Start: 2021-10-05 | End: 2021-11-04

## 2021-10-05 NOTE — PROGRESS NOTES
Subjective  [place]    HPI  Sera Bonilla, who was evaluated through a synchronous (real-time) virtual platform audio-video encounter, and/or her healthcare decision maker, is aware that it is a billable service, with coverage as determined by her insurance carrier. She provided verbal consent to proceed  \"Yes\"}, and patient identification was verified. This visit was conducted pursuant to the emergency declaration under the [address] [place] [address] and the [employer] Act. A caregiver was present when appropriate. Ability to conduct physical exam was limited. The patient was located in a state where the provider was credentialed to provide care. --Tab Dumont MD on 10/5/2021 at 2:23 PM    Pt was seen virtually for a f/u of HIV infection. Her last virtual encounter was in 04/2021, labs were ordered but not done, pt claims she never received order in mail. She is maintained on Biktarvy, previously on [address] while pregnant. Most recent labs in 10/2020 showed a T cell count of 593 (34.9%) and an HIV RNA VL of < 20 copies per ml. She reports insomnia which commenced after initiation of Biktarvy, pt takes biktarvy at night. She has gained some weight recently and wonders if it may be related to [employer] She will like to be involved in a social [place] w other HIV positive individuals. She is otherwise doing well and denies acute complaints on assessment today. ROS  Review of Systems   Constitutional: Negative. Weight gain    Respiratory: Negative. Cardiovascular: Negative. Genitourinary: Negative. Musculoskeletal: Negative. Skin: Negative. Neurological: Negative. Psychiatric/Behavioral: Negative.         Past Medical History:   Diagnosis Date    Anxiety and depression     Anxiety disorder     Asthma     History of trichomonal vaginitis     HIV (human immunodeficiency virus infection) ([facility]     Obesity     Obesity     Sickle cell anemia (HCC)     Sickle cell trait ([facility]     FOB neg        Past Surgical History:   Procedure Laterality Date    HX  SECTION               Social History     Tobacco Use    Smoking status: Never Smoker    Smokeless tobacco: Never Used   Vaping Use    Vaping Use: Never used   Substance Use Topics    Alcohol use: Not Currently    Drug use: Never        Family History   Problem Relation Age of Onset    Diabetes Father         Allergies   Allergen Reactions    Cat Dander Hives    Keflex [Cephalexin] Hives        Objective  Physical Exam: Could not be performed virtually        Assessment & Plan  Diagnoses and all orders for this visit:    1. HIV infection, unspecified symptom status ([facility]  -     fedryleqn-fotghupo-pyjldjy ala (Biktarvy) tab tablet; Take 1 Tablet by mouth daily for 30 days.  -     HIV-1 RNA QT BY PCR  -     LYMPHOCYTES, CD4 PERCENT AND ABSOLUTE  -     METABOLIC PANEL, COMPREHENSIVE  -     RPR  -     QUANTIFERON-TB PLUS(CLIENT INCUB.); Future  - Encouraged on continued compliance w cART, advised to notify the ID office of side effects  - Will check routine labs as above, refills for Biktarvy renewed  - Reassess in 6  Months     2. Weight gain: Unclear if due to [address]     3. Insomnia: May be an uncommon side effect of Biktarvy, advised to take in the mornings not at night     4.  [facility] per pt request

## 2022-01-28 ENCOUNTER — TELEPHONE (OUTPATIENT)
Dept: INFECTIOUS DISEASES | Age: 22
End: 2022-01-28

## 2022-01-28 DIAGNOSIS — U07.1 COVID-19: Primary | ICD-10-CM

## 2022-01-28 RX ORDER — NIRMATRELVIR AND RITONAVIR 300-100 MG
KIT ORAL
Qty: 1 BOX | Refills: 0 | Status: SHIPPED | OUTPATIENT
Start: 2022-01-28

## 2022-03-14 DIAGNOSIS — B20 HIV INFECTION, UNSPECIFIED SYMPTOM STATUS (HCC): Primary | ICD-10-CM

## 2022-03-14 RX ORDER — BICTEGRAVIR SODIUM, EMTRICITABINE, AND TENOFOVIR ALAFENAMIDE FUMARATE 50; 200; 25 MG/1; MG/1; MG/1
1 TABLET ORAL DAILY
Qty: 30 TABLET | Refills: 5 | Status: SHIPPED | OUTPATIENT
Start: 2022-03-14 | End: 2022-04-13

## 2022-03-18 PROBLEM — R03.0 ELEVATED BLOOD PRESSURE READING WITHOUT DIAGNOSIS OF HYPERTENSION: Status: ACTIVE | Noted: 2020-10-14

## 2022-03-19 PROBLEM — Z98.891 HISTORY OF CESAREAN DELIVERY: Status: ACTIVE | Noted: 2020-08-10

## 2022-03-19 PROBLEM — D64.9 ANEMIA: Status: ACTIVE | Noted: 2020-08-10

## 2022-03-19 PROBLEM — B20 HIV INFECTION (HCC): Status: ACTIVE | Noted: 2020-08-04

## 2022-03-19 PROBLEM — O13.3 GESTATIONAL HYPERTENSION, THIRD TRIMESTER: Status: ACTIVE | Noted: 2020-11-24

## 2022-03-19 PROBLEM — Z21 HIV INFECTION (HCC): Status: ACTIVE | Noted: 2020-08-04

## 2022-03-19 PROBLEM — Q51.3 BICORNUATE UTERUS: Status: ACTIVE | Noted: 2020-08-10

## 2022-03-19 PROBLEM — O41.03X0 OLIGOHYDRAMNIOS IN THIRD TRIMESTER: Status: ACTIVE | Noted: 2020-12-03

## 2022-03-20 PROBLEM — R10.9 ABDOMINAL PAIN: Status: ACTIVE | Noted: 2020-11-18

## 2022-03-20 PROBLEM — O09.93 HIGH-RISK PREGNANCY IN THIRD TRIMESTER: Status: ACTIVE | Noted: 2020-09-30

## 2022-10-10 ENCOUNTER — TELEPHONE (OUTPATIENT)
Dept: INFECTIOUS DISEASES | Age: 22
End: 2022-10-10

## 2022-10-10 LAB
ALBUMIN SERPL-MCNC: 3.9 G/DL (ref 3.9–5)
ALBUMIN/GLOB SERPL: 1.1 {RATIO} (ref 1.2–2.2)
ALP SERPL-CCNC: 99 IU/L (ref 44–121)
ALT SERPL-CCNC: 8 IU/L (ref 0–32)
AST SERPL-CCNC: 13 IU/L (ref 0–40)
BASOPHILS # BLD AUTO: 0.1 X10E3/UL (ref 0–0.2)
BASOPHILS NFR BLD AUTO: 1 %
BILIRUB SERPL-MCNC: 0.3 MG/DL (ref 0–1.2)
BUN SERPL-MCNC: 12 MG/DL (ref 6–20)
BUN/CREAT SERPL: 10 (ref 9–23)
CALCIUM SERPL-MCNC: 9.1 MG/DL (ref 8.7–10.2)
CD3+CD4+ CELLS # BLD: 870 /UL (ref 359–1519)
CD3+CD4+ CELLS NFR BLD: 45.8 % (ref 30.8–58.5)
CHLORIDE SERPL-SCNC: 101 MMOL/L (ref 96–106)
CO2 SERPL-SCNC: 23 MMOL/L (ref 20–29)
CREAT SERPL-MCNC: 1.18 MG/DL (ref 0.57–1)
EGFR: 67 ML/MIN/1.73
EOSINOPHIL # BLD AUTO: 0.2 X10E3/UL (ref 0–0.4)
EOSINOPHIL NFR BLD AUTO: 2 %
ERYTHROCYTE [DISTWIDTH] IN BLOOD BY AUTOMATED COUNT: 16.4 % (ref 11.7–15.4)
GLOBULIN SER CALC-MCNC: 3.5 G/DL (ref 1.5–4.5)
GLUCOSE SERPL-MCNC: 91 MG/DL (ref 70–99)
HCT VFR BLD AUTO: 32 % (ref 34–46.6)
HGB BLD-MCNC: 9.6 G/DL (ref 11.1–15.9)
HIV1 RNA # SERPL NAA+PROBE: <20 COPIES/ML
HIV1 RNA SERPL NAA+PROBE-LOG#: NORMAL LOG10COPY/ML
IMM GRANULOCYTES # BLD AUTO: 0 X10E3/UL (ref 0–0.1)
IMM GRANULOCYTES NFR BLD AUTO: 1 %
LYMPHOCYTES # BLD AUTO: 1.9 X10E3/UL (ref 0.7–3.1)
LYMPHOCYTES NFR BLD AUTO: 23 %
MCH RBC QN AUTO: 22.1 PG (ref 26.6–33)
MCHC RBC AUTO-ENTMCNC: 30 G/DL (ref 31.5–35.7)
MCV RBC AUTO: 74 FL (ref 79–97)
MONOCYTES # BLD AUTO: 0.6 X10E3/UL (ref 0.1–0.9)
MONOCYTES NFR BLD AUTO: 7 %
NEUTROPHILS # BLD AUTO: 5.4 X10E3/UL (ref 1.4–7)
NEUTROPHILS NFR BLD AUTO: 66 %
PLATELET # BLD AUTO: 490 X10E3/UL (ref 150–450)
POTASSIUM SERPL-SCNC: 4.8 MMOL/L (ref 3.5–5.2)
PROT SERPL-MCNC: 7.4 G/DL (ref 6–8.5)
RBC # BLD AUTO: 4.35 X10E6/UL (ref 3.77–5.28)
RPR SER QL: NON REACTIVE
SODIUM SERPL-SCNC: 139 MMOL/L (ref 134–144)
WBC # BLD AUTO: 8.2 X10E3/UL (ref 3.4–10.8)

## 2022-11-23 ENCOUNTER — HOSPITAL ENCOUNTER (EMERGENCY)
Age: 22
Discharge: HOME | End: 2022-11-23
Payer: MEDICAID

## 2022-11-23 ENCOUNTER — APPOINTMENT (OUTPATIENT)
Dept: GENERAL RADIOLOGY | Age: 22
End: 2022-11-23
Attending: PHYSICIAN ASSISTANT
Payer: MEDICAID

## 2022-11-23 VITALS
HEART RATE: 92 BPM | SYSTOLIC BLOOD PRESSURE: 119 MMHG | DIASTOLIC BLOOD PRESSURE: 74 MMHG | TEMPERATURE: 99.5 F | OXYGEN SATURATION: 97 % | HEIGHT: 71 IN | RESPIRATION RATE: 18 BRPM | WEIGHT: 293 LBS | BODY MASS INDEX: 41.02 KG/M2

## 2022-11-23 DIAGNOSIS — E86.0 DEHYDRATION: ICD-10-CM

## 2022-11-23 DIAGNOSIS — J10.1 INFLUENZA A: Primary | ICD-10-CM

## 2022-11-23 LAB
ALBUMIN SERPL-MCNC: 3.1 G/DL (ref 3.5–5)
ALBUMIN/GLOB SERPL: 0.7 {RATIO} (ref 1.1–2.2)
ALP SERPL-CCNC: 65 U/L (ref 45–117)
ALT SERPL-CCNC: 23 U/L (ref 12–78)
ANION GAP SERPL CALC-SCNC: 9 MMOL/L (ref 5–15)
APPEARANCE UR: CLEAR
AST SERPL W P-5'-P-CCNC: 19 U/L (ref 15–37)
BACTERIA URNS QL MICRO: NEGATIVE /HPF
BASOPHILS # BLD: 0 K/UL (ref 0–0.1)
BASOPHILS NFR BLD: 0 % (ref 0–1)
BILIRUB SERPL-MCNC: 0.5 MG/DL (ref 0.2–1)
BILIRUB UR QL: NEGATIVE
BUN SERPL-MCNC: 12 MG/DL (ref 6–20)
BUN/CREAT SERPL: 10 (ref 12–20)
CA-I BLD-MCNC: 8 MG/DL (ref 8.5–10.1)
CHLORIDE SERPL-SCNC: 100 MMOL/L (ref 97–108)
CO2 SERPL-SCNC: 26 MMOL/L (ref 21–32)
COLOR UR: ABNORMAL
CREAT SERPL-MCNC: 1.23 MG/DL (ref 0.55–1.02)
DIFFERENTIAL METHOD BLD: ABNORMAL
EOSINOPHIL # BLD: 0 K/UL (ref 0–0.4)
EOSINOPHIL NFR BLD: 0 % (ref 0–7)
EPITH CASTS URNS QL MICRO: ABNORMAL /LPF
ERYTHROCYTE [DISTWIDTH] IN BLOOD BY AUTOMATED COUNT: 17 % (ref 11.5–14.5)
FLUAV AG NPH QL IA: POSITIVE
FLUBV AG NOSE QL IA: NEGATIVE
GLOBULIN SER CALC-MCNC: 4.3 G/DL (ref 2–4)
GLUCOSE SERPL-MCNC: 93 MG/DL (ref 65–100)
GLUCOSE UR STRIP.AUTO-MCNC: NEGATIVE MG/DL
HCG UR QL: NEGATIVE
HCT VFR BLD AUTO: 27.5 % (ref 35–47)
HGB BLD-MCNC: 8.5 G/DL (ref 11.5–16)
HGB UR QL STRIP: NEGATIVE
IMM GRANULOCYTES # BLD AUTO: 0 K/UL
IMM GRANULOCYTES NFR BLD AUTO: 0 %
KETONES UR QL STRIP.AUTO: NEGATIVE MG/DL
LACTATE SERPL-SCNC: 0.7 MMOL/L (ref 0.4–2)
LEUKOCYTE ESTERASE UR QL STRIP.AUTO: ABNORMAL
LYMPHOCYTES # BLD: 0.9 K/UL (ref 0.8–3.5)
LYMPHOCYTES NFR BLD: 19 % (ref 12–49)
MCH RBC QN AUTO: 22.1 PG (ref 26–34)
MCHC RBC AUTO-ENTMCNC: 30.9 G/DL (ref 30–36.5)
MCV RBC AUTO: 71.6 FL (ref 80–99)
MONOCYTES # BLD: 0.7 K/UL (ref 0–1)
MONOCYTES NFR BLD: 14 % (ref 5–13)
NEUTS SEG # BLD: 3.3 K/UL (ref 1.8–8)
NEUTS SEG NFR BLD: 67 % (ref 32–75)
NITRITE UR QL STRIP.AUTO: NEGATIVE
PH UR STRIP: 5 [PH] (ref 5–8)
PLATELET # BLD AUTO: 308 K/UL (ref 150–400)
PMV BLD AUTO: 9.3 FL (ref 8.9–12.9)
POTASSIUM SERPL-SCNC: 3.7 MMOL/L (ref 3.5–5.1)
PROT SERPL-MCNC: 7.4 G/DL (ref 6.4–8.2)
PROT UR STRIP-MCNC: 30 MG/DL
RBC # BLD AUTO: 3.84 M/UL (ref 3.8–5.2)
RBC #/AREA URNS HPF: ABNORMAL /HPF (ref 0–5)
RBC MORPH BLD: ABNORMAL
RBC MORPH BLD: ABNORMAL
SODIUM SERPL-SCNC: 135 MMOL/L (ref 136–145)
SP GR UR REFRACTOMETRY: 1.01 (ref 1–1.03)
UA: UC IF INDICATED,UAUC: ABNORMAL
UROBILINOGEN UR QL STRIP.AUTO: 0.1 EU/DL (ref 0.2–1)
WBC # BLD AUTO: 4.9 K/UL (ref 3.6–11)
WBC URNS QL MICRO: ABNORMAL /HPF (ref 0–4)

## 2022-11-23 PROCEDURE — 85025 COMPLETE CBC W/AUTO DIFF WBC: CPT

## 2022-11-23 PROCEDURE — 81025 URINE PREGNANCY TEST: CPT

## 2022-11-23 PROCEDURE — 81001 URINALYSIS AUTO W/SCOPE: CPT

## 2022-11-23 PROCEDURE — 83605 ASSAY OF LACTIC ACID: CPT

## 2022-11-23 PROCEDURE — 96360 HYDRATION IV INFUSION INIT: CPT

## 2022-11-23 PROCEDURE — 99284 EMERGENCY DEPT VISIT MOD MDM: CPT

## 2022-11-23 PROCEDURE — 87804 INFLUENZA ASSAY W/OPTIC: CPT

## 2022-11-23 PROCEDURE — 74011250636 HC RX REV CODE- 250/636: Performed by: PHYSICIAN ASSISTANT

## 2022-11-23 PROCEDURE — 71045 X-RAY EXAM CHEST 1 VIEW: CPT

## 2022-11-23 PROCEDURE — 74011250637 HC RX REV CODE- 250/637: Performed by: EMERGENCY MEDICINE

## 2022-11-23 PROCEDURE — 80053 COMPREHEN METABOLIC PANEL: CPT

## 2022-11-23 RX ORDER — ONDANSETRON 4 MG/1
4 TABLET, ORALLY DISINTEGRATING ORAL
Qty: 12 TABLET | Refills: 0 | Status: SHIPPED | OUTPATIENT
Start: 2022-11-23

## 2022-11-23 RX ORDER — IBUPROFEN 800 MG/1
800 TABLET ORAL ONCE
Status: COMPLETED | OUTPATIENT
Start: 2022-11-23 | End: 2022-11-23

## 2022-11-23 RX ORDER — OSELTAMIVIR PHOSPHATE 75 MG/1
75 CAPSULE ORAL 2 TIMES DAILY
Qty: 10 CAPSULE | Refills: 0 | Status: SHIPPED | OUTPATIENT
Start: 2022-11-23 | End: 2022-11-28

## 2022-11-23 RX ORDER — ACETAMINOPHEN 500 MG
1000 TABLET ORAL ONCE
Status: COMPLETED | OUTPATIENT
Start: 2022-11-23 | End: 2022-11-23

## 2022-11-23 RX ADMIN — IBUPROFEN 800 MG: 800 TABLET, FILM COATED ORAL at 10:15

## 2022-11-23 RX ADMIN — SODIUM CHLORIDE 1000 ML: 9 INJECTION, SOLUTION INTRAVENOUS at 12:55

## 2022-11-23 RX ADMIN — ACETAMINOPHEN 1000 MG: 500 TABLET ORAL at 10:15

## 2022-11-23 RX ADMIN — SODIUM CHLORIDE 1000 ML: 9 INJECTION, SOLUTION INTRAVENOUS at 11:44

## 2022-11-23 NOTE — ED NOTES
Pt voiced to ED RN that she has also been having lower back pain and irritation when she voids, pt requesting to have urine checked for UTI.

## 2022-11-23 NOTE — ED TRIAGE NOTES
Pt states she has had a fever, chills, anterior headache with nasal congestion and pressure that started yesterday. Pt states she took Nyquil last night.

## 2022-11-23 NOTE — Clinical Note
[address]  [address] [facility] [address]  [phone]    Work/School Note    Date: 11/23/2022    To Whom It May concern:    [place] was seen and treated today in the emergency room by the following provider(s):  Physician Assistant: Berkley Caraballo PA-C. [place] is excused from work/school on 11/23/2022 through 11/25/2022. She is medically clear to return to work/school on 11/26/2022.          Sincerely,          Ishmael Saldaña PA-C

## 2022-11-23 NOTE — ED PROVIDER NOTES
EMERGENCY DEPARTMENT HISTORY AND PHYSICAL EXAM      Date: 11/23/2022  Patient Name: [place]    History of Presenting Illness     Chief Complaint   Patient presents with    Fever    Headache    Nasal Congestion    Cough       History Provided By: Patient    HPI: [place], 23 y.o. female presents to the ED with CC of flu-like symptoms. Patient reports a 2-day history of fever, chills, body aches, cough, congestion, generalized malaise. States that her son was sick with similar symptoms last week. Patient treated symptoms with NyQuil last night but denies taking anything today. Notes no alleviating or exacerbating factors. States that she is otherwise well and specifically denies any sore throat, abdominal pain, nausea, vomiting, changes in bowel or bladder habits, headaches, lightheadedness, dizziness, diaphoresis, rash. Patient denies SOB, chest pain, or any neurological symptoms. There are no other complaints, changes, or physical findings at this time. Past History     Past Medical History:  Past Medical History:   Diagnosis Date    Anxiety and depression     Anxiety disorder     Asthma     History of trichomonal vaginitis     HIV (human immunodeficiency virus infection) ([facility]     Obesity     Obesity     Sickle cell anemia ([facility]     Sickle cell trait ([facility] [address]     FOB neg       Allergies: Allergies   Allergen Reactions    Cat Dander Hives    Keflex [Cephalexin] Hives       Review of Systems   Vital signs and nursing notes reviewed  Review of Systems   Constitutional:  Positive for chills and fever. Negative for diaphoresis. Malaise   HENT:  Positive for congestion. Negative for rhinorrhea and sore throat. Eyes: Negative. Respiratory:  Positive for cough. Negative for chest tightness, shortness of breath and wheezing. Cardiovascular: Negative. Negative for chest pain. Gastrointestinal: Negative. Negative for abdominal pain, diarrhea, nausea and vomiting. Genitourinary: Negative. Negative for difficulty urinating, dysuria, flank pain and frequency. Musculoskeletal:  Positive for myalgias. Skin: Negative. Negative for rash. Neurological:  Negative for dizziness, syncope, weakness, light-headedness, numbness and headaches. Psychiatric/Behavioral: Negative. All other systems reviewed and are negative. Physical Exam   Visit Vitals  /74 (BP 1 Location: Left upper arm, BP Patient Position: At rest)   Pulse 92   Temp 99.5 °F (37.5 °C)   Resp 18   Ht 5' 11\" (1.803 m)   Wt 145.2 kg (320 lb)   SpO2 97%   BMI 44.63 kg/m²     CONSTITUTIONAL: Alert, in no distress. Appears stated age. Warm to the touch  HEAD:  Normocephalic, atraumatic  EYES: EOM intact. No conjunctival injection or scleral icterus  Neck:  Supple. No meningismus  RESP: Normal with no work of breathing, speaking in full sentences. CV: Well perfused. NEURO: Alert with normal mentation, moving extremities spontaneously  PSYCH: Normal mood, normal affect      Medical Decision Making   Patient presents for COVID 19 testing with normal oxygen saturation and mild URI symptoms or COVID 19 exposure. COVID 19 testing was not conducted. The patient was given quarantine/isolation recommendations and agrees with the plan to be discharged home. They were provided instructions to return for difficulty breathing, chest pain, altered mentation, or any other new or worsening symptoms. ED Course:   Initial assessment performed. The patients presenting problems have been discussed, and they are in agreement with the care plan formulated and outlined with them. I have encouraged them to ask questions as they arise throughout their visit. 12:01 PM  Patient reassessed and endorses marked improvement in symptoms. However, she still mildly tachycardic and has received 500 mL normal saline. We will allow fluids to finish and reassess.   If she is persistently tachycardic will work-up for sepsis. 1:32 PM  Lab work unremarkable, including negative lactic acid. Patient continues to report improvement in symptoms. She has been educated on strict return precautions and conveys good understanding agree with care plan as outlined. Patient will follow up with her primary care within the next week, return to ED sooner if worse. She has no new complaints or concerns all her questions have been answered. Patient ready for discharge home. Critical Care Time: None    Disposition:  DISCHARGE NOTE:  The pt is ready for discharge. The pt's signs, symptoms, diagnosis, and discharge instructions have been discussed and pt has conveyed their understanding. The pt is to follow up as recommended or return to ER should their symptoms worsen. Plan has been discussed and pt is in agreement. PLAN:  1. Discharge Medication List as of 11/23/2022  1:37 PM        START taking these medications    Details   oseltamivir (Tamiflu) 75 mg capsule Take 1 Capsule by mouth two (2) times a day for 5 days. , Normal, Disp-10 Capsule, R-0      ondansetron (ZOFRAN ODT) 4 mg disintegrating tablet Take 1 Tablet by mouth every eight (8) hours as needed for Nausea or Vomiting., Normal, Disp-12 Tablet, R-0           CONTINUE these medications which have NOT CHANGED    Details   nirmatrelvir-ritonavir (Paxlovid, EUA,) 300 mg (150 mg x 2)-100 mg tablet 1 tab po BID for 5 days, please do not take at the same time w Angusarvy, Normal, Disp-1 Box, R-0      emtricitabine-tenofovir alafen (Descovy) tablet Take 1 Tab by mouth daily. , Normal, Disp-30 Tab, R-3      !! raltegravir (ISENTRESS) 400 mg tablet Take 1 Tab by mouth two (2) times a day., Normal, Disp-60 Tab, R-3      ibuprofen (MOTRIN) 800 mg tablet Take 1 Tab by mouth every eight (8) hours. , Normal, Disp-40 Tab,R-0      prenatal ZRFA03/HWHR fum/folic (prenatal vitamin) 27 mg iron- 800 mcg tab tablet Historical Med      !! raltegravir (ISENTRESS) 400 mg tablet Take 1 Tab by mouth two (2) times a day., Normal, Disp-60 Tab,R-3       !! - Potential duplicate medications found. Please discuss with provider. 2.   Follow-up Information       Follow up With Specialties Details [address]  Schedule an appointment as soon as possible for a visit  As needed [address]  [address] [address]    [address] [facility] Emergency Medicine  If symptoms worsen [address] [facility] [address] [phone]          3. COVID Testing results will be called once available if positive. Patient should utilize [employer] to access results. 4. Take Tylenol or Ibuprofen as needed  5. Drink plenty of fluids  6. Return to ED if worse especially if any shortness of breath, chest pain or altered mentation. Diagnosis     Clinical Impression:   1. Influenza A    2. Dehydration        Please note that this dictation was completed with [employer], the computer voice recognition software. Quite often unanticipated grammatical, syntax, homophones, and other interpretive errors are inadvertently transcribed by the computer software. Please disregards these errors. Please excuse any errors that have escaped final proofreading.

## 2023-05-25 RX ORDER — EMTRICITABINE AND TENOFOVIR ALAFENAMIDE 200; 25 MG/1; MG/1
1 TABLET ORAL DAILY
COMMUNITY
Start: 2021-01-05 | End: 2023-06-08 | Stop reason: ALTCHOICE

## 2023-05-25 RX ORDER — IBUPROFEN 800 MG/1
800 TABLET ORAL EVERY 8 HOURS
COMMUNITY
Start: 2020-12-05 | End: 2023-06-08 | Stop reason: ALTCHOICE

## 2023-05-25 RX ORDER — ONDANSETRON 4 MG/1
4 TABLET, ORALLY DISINTEGRATING ORAL EVERY 8 HOURS PRN
COMMUNITY
Start: 2022-11-23 | End: 2023-06-08 | Stop reason: ALTCHOICE

## 2023-06-08 ENCOUNTER — OFFICE VISIT (OUTPATIENT)
Age: 23
End: 2023-06-08
Payer: MEDICAID

## 2023-06-08 VITALS — BODY MASS INDEX: 41.28 KG/M2 | DIASTOLIC BLOOD PRESSURE: 90 MMHG | WEIGHT: 293 LBS | SYSTOLIC BLOOD PRESSURE: 152 MMHG

## 2023-06-08 DIAGNOSIS — Z01.419 WELL WOMAN EXAM WITH ROUTINE GYNECOLOGICAL EXAM: Primary | ICD-10-CM

## 2023-06-08 PROCEDURE — 99395 PREV VISIT EST AGE 18-39: CPT | Performed by: OBSTETRICS & GYNECOLOGY

## 2023-06-08 RX ORDER — MEDROXYPROGESTERONE ACETATE 10 MG/1
10 TABLET ORAL DAILY
Qty: 10 TABLET | Refills: 0 | Status: SHIPPED | OUTPATIENT
Start: 2023-06-08

## 2023-06-08 RX ORDER — BICTEGRAVIR SODIUM, EMTRICITABINE, AND TENOFOVIR ALAFENAMIDE FUMARATE 50; 200; 25 MG/1; MG/1; MG/1
1 TABLET ORAL DAILY
COMMUNITY
Start: 2023-05-27

## 2023-06-08 NOTE — PROGRESS NOTES
HISTORY OF PRESENT ILLNESS  Danilo Helms is a 23 y.o. female who presents today for the following:  Chief Complaint   Patient presents with    Annual Exam   Patient is a 68-year-old G4, P4 female who presents for routine annual exam.  She complains of irregular menses on presentation today which she states is heavy and sometimes extended with passage of clots. She also reports sometimes with her. She will have pain in her  section scar consistent with endometriosis within the scar. [id number]    Allergies   Allergen Reactions    Cat Hair Extract Hives    Cephalexin Hives       Current Outpatient Medications   Medication Sig Dispense Refill    BIKTARVY -25 MG TABS per tablet Take 1 tablet by mouth daily       No current facility-administered medications for this visit.         Past Medical History:   Diagnosis Date    Anxiety and depression     Anxiety disorder     Asthma     History of trichomonal vaginitis     HIV (human immunodeficiency virus infection) ([facility] [address]     Obesity     Obesity     Sickle cell anemia ([facility])     Sickle cell trait ([facility] [address] [facility]     FOB neg       Past Surgical History:   Procedure Laterality Date     SECTION              Family History   Problem Relation Age of Onset    Diabetes Father        Social History     Socioeconomic History    Marital status: Single     Spouse name: Not on file    Number of children: Not on file    Years of education: Not on file    Highest education level: Not on file   Occupational History    Not on file   Tobacco Use    Smoking status: Never    Smokeless tobacco: Never   Substance and Sexual Activity    Alcohol use: Yes    Drug use: Never    Sexual activity: Yes     Partners: Male     Birth control/protection: None   Other Topics Concern    Not on file   Social History Narrative    Not on file     Social Determinants of Health     Financial Resource Strain: Not on file   Food Insecurity: Not on file   Transportation Needs: Not on file   Physical

## 2023-08-23 ENCOUNTER — TELEPHONE (OUTPATIENT)
Age: 23
End: 2023-08-23

## 2023-08-23 NOTE — TELEPHONE ENCOUNTER
Called and left a voicemail on 08/23 at 2:24   Needs to request medical records. I returned the patients call on 08/23 at 2:36 PM. I explained to the patient how she can request her medical records through the [employer], [employer] or signing a request of medical records through her new provider. Patient will sign the release with her new doctor tomorrow.

## 2023-08-24 DIAGNOSIS — Z21 ASYMPTOMATIC HIV INFECTION, WITH NO HISTORY OF HIV-RELATED ILLNESS (HCC): Primary | ICD-10-CM

## 2023-08-24 RX ORDER — BICTEGRAVIR SODIUM, EMTRICITABINE, AND TENOFOVIR ALAFENAMIDE FUMARATE 50; 200; 25 MG/1; MG/1; MG/1
1 TABLET ORAL DAILY
Qty: 30 TABLET | Refills: 5 | Status: SHIPPED | OUTPATIENT
Start: 2023-08-24 | End: 2023-09-23

## 2023-08-24 RX ORDER — BICTEGRAVIR SODIUM, EMTRICITABINE, AND TENOFOVIR ALAFENAMIDE FUMARATE 50; 200; 25 MG/1; MG/1; MG/1
1 TABLET ORAL DAILY
Qty: 30 TABLET | OUTPATIENT
Start: 2023-08-24

## 2024-05-23 ENCOUNTER — HOSPITAL ENCOUNTER (EMERGENCY)
Facility: HOSPITAL | Age: 24
Discharge: HOME | End: 2024-05-23
Payer: MEDICAID

## 2024-05-23 ENCOUNTER — APPOINTMENT (OUTPATIENT)
Facility: HOSPITAL | Age: 24
End: 2024-05-23
Payer: MEDICAID

## 2024-05-23 VITALS
TEMPERATURE: 98 F | HEIGHT: 69 IN | HEART RATE: 84 BPM | OXYGEN SATURATION: 100 % | WEIGHT: 270 LBS | SYSTOLIC BLOOD PRESSURE: 117 MMHG | BODY MASS INDEX: 39.99 KG/M2 | RESPIRATION RATE: 16 BRPM | DIASTOLIC BLOOD PRESSURE: 57 MMHG

## 2024-05-23 DIAGNOSIS — Z34.91 FIRST TRIMESTER PREGNANCY: Primary | ICD-10-CM

## 2024-05-23 DIAGNOSIS — N30.00 ACUTE CYSTITIS WITHOUT HEMATURIA: ICD-10-CM

## 2024-05-23 LAB
ANION GAP SERPL CALC-SCNC: 10 MMOL/L (ref 5–15)
APPEARANCE UR: ABNORMAL
BACTERIA URNS QL MICRO: ABNORMAL /HPF
BASOPHILS # BLD: 0.1 K/UL (ref 0–0.1)
BASOPHILS NFR BLD: 1 % (ref 0–1)
BILIRUB UR QL: NEGATIVE
BUN SERPL-MCNC: 9 MG/DL (ref 6–20)
BUN/CREAT SERPL: 10 (ref 12–20)
CA-I BLD-MCNC: 8.7 MG/DL (ref 8.5–10.1)
CHLORIDE SERPL-SCNC: 103 MMOL/L (ref 97–108)
CO2 SERPL-SCNC: 26 MMOL/L (ref 21–32)
COLOR UR: YELLOW
CREAT SERPL-MCNC: 0.91 MG/DL (ref 0.55–1.02)
DIFFERENTIAL METHOD BLD: ABNORMAL
EOSINOPHIL # BLD: 0.1 K/UL (ref 0–0.4)
EOSINOPHIL NFR BLD: 2 % (ref 0–7)
EPITH CASTS URNS QL MICRO: ABNORMAL /LPF
ERYTHROCYTE [DISTWIDTH] IN BLOOD BY AUTOMATED COUNT: 16.9 % (ref 11.5–14.5)
GLUCOSE SERPL-MCNC: 103 MG/DL (ref 65–100)
GLUCOSE UR STRIP.AUTO-MCNC: NEGATIVE MG/DL
HCG SERPL-ACNC: 641 MIU/ML (ref 0–6)
HCG UR QL: POSITIVE
HCT VFR BLD AUTO: 30.1 % (ref 35–47)
HGB BLD-MCNC: 9.5 G/DL (ref 11.5–16)
HGB UR QL STRIP: NEGATIVE
IMM GRANULOCYTES # BLD AUTO: 0 K/UL (ref 0–0.04)
IMM GRANULOCYTES NFR BLD AUTO: 0 % (ref 0–0.5)
KETONES UR QL STRIP.AUTO: NEGATIVE MG/DL
LEUKOCYTE ESTERASE UR QL STRIP.AUTO: ABNORMAL
LYMPHOCYTES # BLD: 1.6 K/UL (ref 0.8–3.5)
LYMPHOCYTES NFR BLD: 22 % (ref 12–49)
MCH RBC QN AUTO: 22.8 PG (ref 26–34)
MCHC RBC AUTO-ENTMCNC: 31.6 G/DL (ref 30–36.5)
MCV RBC AUTO: 72.4 FL (ref 80–99)
MONOCYTES # BLD: 0.5 K/UL (ref 0–1)
MONOCYTES NFR BLD: 6 % (ref 5–13)
NEUTS SEG # BLD: 5.2 K/UL (ref 1.8–8)
NEUTS SEG NFR BLD: 69 % (ref 32–75)
NITRITE UR QL STRIP.AUTO: NEGATIVE
PH UR STRIP: 6 (ref 5–8)
PLATELET # BLD AUTO: 449 K/UL (ref 150–400)
PMV BLD AUTO: 9 FL (ref 8.9–12.9)
POTASSIUM SERPL-SCNC: 4 MMOL/L (ref 3.5–5.1)
PROT UR STRIP-MCNC: ABNORMAL MG/DL
RBC # BLD AUTO: 4.16 M/UL (ref 3.8–5.2)
RBC #/AREA URNS HPF: ABNORMAL /HPF (ref 0–5)
SODIUM SERPL-SCNC: 139 MMOL/L (ref 136–145)
SP GR UR REFRACTOMETRY: 1.01 (ref 1–1.03)
URINE CULTURE IF INDICATED: ABNORMAL
UROBILINOGEN UR QL STRIP.AUTO: 0.1 EU/DL (ref 0.2–1)
WBC # BLD AUTO: 7.5 K/UL (ref 3.6–11)
WBC URNS QL MICRO: ABNORMAL /HPF (ref 0–4)

## 2024-05-23 PROCEDURE — 36415 COLL VENOUS BLD VENIPUNCTURE: CPT

## 2024-05-23 PROCEDURE — 86900 BLOOD TYPING SEROLOGIC ABO: CPT

## 2024-05-23 PROCEDURE — 76817 TRANSVAGINAL US OBSTETRIC: CPT

## 2024-05-23 PROCEDURE — 81025 URINE PREGNANCY TEST: CPT

## 2024-05-23 PROCEDURE — 80048 BASIC METABOLIC PNL TOTAL CA: CPT

## 2024-05-23 PROCEDURE — 99284 EMERGENCY DEPT VISIT MOD MDM: CPT

## 2024-05-23 PROCEDURE — 81001 URINALYSIS AUTO W/SCOPE: CPT

## 2024-05-23 PROCEDURE — 85025 COMPLETE CBC W/AUTO DIFF WBC: CPT

## 2024-05-23 PROCEDURE — 84702 CHORIONIC GONADOTROPIN TEST: CPT

## 2024-05-23 PROCEDURE — 86901 BLOOD TYPING SEROLOGIC RH(D): CPT

## 2024-05-23 RX ORDER — GRANULES FOR ORAL 3 G/1
3 POWDER ORAL ONCE
Qty: 1 EACH | Refills: 0 | Status: SHIPPED | OUTPATIENT
Start: 2024-05-23 | End: 2024-05-23

## 2024-05-23 ASSESSMENT — PAIN DESCRIPTION - DESCRIPTORS: DESCRIPTORS: PRESSURE

## 2024-05-23 ASSESSMENT — PAIN - FUNCTIONAL ASSESSMENT: PAIN_FUNCTIONAL_ASSESSMENT: 0-10

## 2024-05-23 ASSESSMENT — PAIN DESCRIPTION - LOCATION: LOCATION: VAGINA

## 2024-05-23 ASSESSMENT — PAIN SCALES - GENERAL: PAINLEVEL_OUTOF10: 3

## 2024-05-23 NOTE — DISCHARGE INSTRUCTIONS
Thank you for choosing our Emergency Department for your care.  It is our privilege to care for you in your time of need.  In the next several days, you may receive a survey via email or mailed to your home about your experience with our team.  We would greatly appreciate you taking a few minutes to complete the survey, as we use this information to learn what we have done well and what we could be doing better. Thank you for trusting us with your care!    Below you will find a list of your tests from today's visit.   Labs  Recent Results (from the past 12 hour(s))   POC Pregnancy Urine Qual    Collection Time: 05/23/24 10:03 AM   Result Value Ref Range    Preg Test, Ur Positive (A) Negative     Basic Metabolic Panel    Collection Time: 05/23/24 10:04 AM   Result Value Ref Range    Sodium 139 136 - 145 mmol/L    Potassium 4.0 3.5 - 5.1 mmol/L    Chloride 103 97 - 108 mmol/L    CO2 26 21 - 32 mmol/L    Anion Gap 10 5 - 15 mmol/L    Glucose 103 (H) 65 - 100 mg/dL    BUN 9 6 - 20 mg/dL    Creatinine 0.91 0.55 - 1.02 mg/dL    BUN/Creatinine Ratio 10 (L) 12 - 20      Est, Glom Filt Rate 90 >60 ml/min/1.73m2    Calcium 8.7 8.5 - 10.1 mg/dL   CBC with Auto Differential    Collection Time: 05/23/24 10:04 AM   Result Value Ref Range    WBC 7.5 3.6 - 11.0 K/uL    RBC 4.16 3.80 - 5.20 M/uL    Hemoglobin 9.5 (L) 11.5 - 16.0 g/dL    Hematocrit 30.1 (L) 35.0 - 47.0 %    MCV 72.4 (L) 80.0 - 99.0 FL    MCH 22.8 (L) 26.0 - 34.0 PG    MCHC 31.6 30.0 - 36.5 g/dL    RDW 16.9 (H) 11.5 - 14.5 %    Platelets 449 (H) 150 - 400 K/uL    MPV 9.0 8.9 - 12.9 FL    Neutrophils % 69 32 - 75 %    Lymphocytes % 22 12 - 49 %    Monocytes % 6 5 - 13 %    Eosinophils % 2 0 - 7 %    Basophils % 1 0 - 1 %    Immature Granulocytes % 0 0.0 - 0.5 %    Neutrophils Absolute 5.2 1.8 - 8.0 K/UL    Lymphocytes Absolute 1.6 0.8 - 3.5 K/UL    Monocytes Absolute 0.5 0.0 - 1.0 K/UL    Eosinophils Absolute 0.1 0.0 - 0.4 K/UL    Basophils Absolute 0.1 0.0 - 0.1

## 2024-05-23 NOTE — ED PROVIDER NOTES
these medications      fosfomycin tromethamine 3 g Pack  Commonly known as: Monurol  Take 1 packet by mouth once for 1 dose            ASK your doctor about these medications      Biktarvy -25 MG Tabs per tablet  Generic drug: bictegravir-emtricitab-tenofovir alafenamide     medroxyPROGESTERone 10 MG tablet  Commonly known as: Provera  Take 1 tablet by mouth daily     norgestimate-ethinyl estradiol 0.025 MG tablet  Commonly known as: ORTHO TRI-CYCLEN LO  Take 1 tablet by mouth daily               Where to Get Your Medications        These medications were sent to [id number] - [place], [place] - [address] - P [phone] - F [phone] [address] [facility] [address]      Phone: [phone]   fosfomycin tromethamine 3 g Pack           DISCONTINUED MEDICATIONS:  Discharge Medication List as of 5/23/2024 12:52 PM          I am the Primary Clinician of Record: Adama Palmer PA-C (electronically signed)    (Please note that parts of this dictation were completed with voice recognition software. Quite often unanticipated grammatical, syntax, homophones, and other interpretive errors are inadvertently transcribed by the computer software. Please disregards these errors. Please excuse any errors that have escaped final proofreading.)     Adama Palmer PA-C  05/23/24 [id number]

## 2024-05-23 NOTE — ED TRIAGE NOTES
PT. TO ED WITH C/O ABDOMINAL CRAMPING THAT STARTED LAST NIGHT AND PAINFUL WITH URINATION THAT STARTED THIS MORNING.  LMP 2/17/2024.  APPOINTMENT WITH OB 6/13/2024, TO CONFIRM PREGNANCY.  NO BLEEDING.

## 2024-05-24 LAB
ABO + RH BLD: NORMAL
BLOOD BANK CMNT PATIENT-IMP: NORMAL

## 2024-12-11 ENCOUNTER — HOSPITAL ENCOUNTER (EMERGENCY)
Facility: HOSPITAL | Age: 24
Discharge: HOME | End: 2024-12-11
Payer: MEDICAID

## 2024-12-11 VITALS
OXYGEN SATURATION: 99 % | DIASTOLIC BLOOD PRESSURE: 80 MMHG | WEIGHT: 293 LBS | SYSTOLIC BLOOD PRESSURE: 127 MMHG | HEIGHT: 69 IN | BODY MASS INDEX: 43.4 KG/M2 | TEMPERATURE: 98.4 F | HEART RATE: 93 BPM | RESPIRATION RATE: 16 BRPM

## 2024-12-11 DIAGNOSIS — N30.00 ACUTE CYSTITIS WITHOUT HEMATURIA: Primary | ICD-10-CM

## 2024-12-11 LAB
APPEARANCE UR: CLEAR
BACTERIA URNS QL MICRO: NEGATIVE /HPF
BILIRUB UR QL: NEGATIVE
COLOR UR: YELLOW
EPITH CASTS URNS QL MICRO: ABNORMAL /LPF
GLUCOSE UR STRIP.AUTO-MCNC: NEGATIVE MG/DL
HCG UR QL: NEGATIVE
HGB UR QL STRIP: NEGATIVE
KETONES UR QL STRIP.AUTO: NEGATIVE MG/DL
LEUKOCYTE ESTERASE UR QL STRIP.AUTO: ABNORMAL
NITRITE UR QL STRIP.AUTO: NEGATIVE
PH UR STRIP: 5 (ref 5–8)
PROT UR STRIP-MCNC: NEGATIVE MG/DL
RBC #/AREA URNS HPF: ABNORMAL /HPF (ref 0–5)
SP GR UR REFRACTOMETRY: 1.01 (ref 1–1.03)
URINE CULTURE IF INDICATED: ABNORMAL
UROBILINOGEN UR QL STRIP.AUTO: 0.1 EU/DL (ref 0.2–1)
WBC URNS QL MICRO: ABNORMAL /HPF (ref 0–4)

## 2024-12-11 PROCEDURE — 81001 URINALYSIS AUTO W/SCOPE: CPT

## 2024-12-11 PROCEDURE — 87086 URINE CULTURE/COLONY COUNT: CPT

## 2024-12-11 PROCEDURE — 87186 SC STD MICRODIL/AGAR DIL: CPT

## 2024-12-11 PROCEDURE — 87088 URINE BACTERIA CULTURE: CPT

## 2024-12-11 PROCEDURE — 99283 EMERGENCY DEPT VISIT LOW MDM: CPT

## 2024-12-11 PROCEDURE — 81025 URINE PREGNANCY TEST: CPT

## 2024-12-11 RX ORDER — NITROFURANTOIN 25; 75 MG/1; MG/1
100 CAPSULE ORAL 2 TIMES DAILY
Qty: 14 CAPSULE | Refills: 0 | Status: SHIPPED | OUTPATIENT
Start: 2024-12-11 | End: 2024-12-18

## 2024-12-11 ASSESSMENT — LIFESTYLE VARIABLES
HOW MANY STANDARD DRINKS CONTAINING ALCOHOL DO YOU HAVE ON A TYPICAL DAY: PATIENT DOES NOT DRINK
HOW OFTEN DO YOU HAVE A DRINK CONTAINING ALCOHOL: NEVER

## 2024-12-11 ASSESSMENT — PAIN - FUNCTIONAL ASSESSMENT: PAIN_FUNCTIONAL_ASSESSMENT: NONE - DENIES PAIN

## 2024-12-11 NOTE — DISCHARGE INSTRUCTIONS
Thank you!  Thank you for allowing me to care for you in the emergency department. It is my goal to provide you with excellent care. If you have not received excellent quality care, please ask to speak to the nurse manager. Please fill out the survey that will come to you by mail or email since we listen to your feedback!     Below you will find a list of your tests from today's visit.  Should you have any questions, please do not hesitate to call the emergency department.    Labs  Recent Results (from the past 12 hour(s))   Urinalysis with Reflex to Culture    Collection Time: 12/11/24  1:30 PM    Specimen: Urine   Result Value Ref Range    Color, UA Yellow      Appearance Clear Clear      Specific Gravity, UA 1.010 1.003 - 1.030      pH, Urine 5.0 5.0 - 8.0      Protein, UA Negative Negative mg/dL    Glucose, Ur Negative Negative mg/dL    Ketones, Urine Negative Negative mg/dL    Bilirubin, Urine Negative Negative      Blood, Urine Negative Negative      Urobilinogen, Urine 0.1 (L) 0.2 - 1.0 EU/dL    Nitrite, Urine Negative Negative      Leukocyte Esterase, Urine Large (A) Negative      WBC, UA  0 - 4 /hpf    RBC, UA 5-10 0 - 5 /hpf    Epithelial Cells, UA Few Few /lpf    BACTERIA, URINE Negative Negative /hpf    Urine Culture if Indicated Urine Culture Ordered (A) Culture not indicated by UA result     POC Pregnancy Urine Qual    Collection Time: 12/11/24  1:31 PM   Result Value Ref Range    Preg Test, Ur Negative Negative         Radiologic Studies  No orders to display     [unfilled]  [unfilled]  ------------------------------------------------------------------------------------------------------------  The exam and treatment you received in the Emergency Department were for an urgent problem and are not intended as complete care. It is important that you follow-up with a doctor, nurse practitioner, or physician assistant to:  (1) confirm your diagnosis,  (2) re-evaluation of changes in your illness and

## 2024-12-11 NOTE — ED PROVIDER NOTES
[facility] EMERGENCY DEPT  EMERGENCY DEPARTMENT HISTORY AND PHYSICAL EXAM      Date: 2024  Patient Name: Humphrey Fajardo  MRN: [id number]  YOB: 2000  Date of evaluation: 2024  Provider: Su Shaver PA-C   Note Started: 1:30 PM EST 24    HISTORY OF PRESENT ILLNESS     Chief Complaint   Patient presents with    Urinary Frequency       History Provided By: Patient    HPI: Humphrey Fajardo is a 24 y.o. female with past medical history listed below, presents for evaluation of urinary frequency, suprapubic pain, burning with urination x 1 day.  Patient states that symptoms started early this morning and she has a history of frequent UTIs.  She has not tried taking medication help with her symptoms.  She denies any vaginal discharge, she is not concerned for STDs.    PAST MEDICAL HISTORY   Past Medical History:  Past Medical History:   Diagnosis Date    Anxiety and depression     Asthma     History of trichomonal vaginitis     HIV (human immunodeficiency virus infection) ([facility])     Obesity     Sickle cell trait ([facility])     FOB neg       Past Surgical History:  Past Surgical History:   Procedure Laterality Date     SECTION              Family History:  Family History   Problem Relation Age of Onset    Diabetes Father        Social History:  Social History     Tobacco Use    Smoking status: Never    Smokeless tobacco: Never   Vaping Use    Vaping status: Never Used   Substance Use Topics    Alcohol use: Yes    Drug use: Never       Allergies:  Allergies   Allergen Reactions    Cat Hair Extract Hives    Cephalexin Hives       PCP: None, None    Current Meds:   No current facility-administered medications for this encounter.     Current Outpatient Medications   Medication Sig Dispense Refill    nitrofurantoin, macrocrystal-monohydrate, (MACROBID) 100 MG capsule Take 1 capsule by mouth 2 times daily for 7 days 14 capsule 0    BIKTARVY -25 MG TABS per tablet Take 1 tablet by mouth daily

## 2024-12-13 LAB
BACTERIA SPEC CULT: ABNORMAL
COLONY COUNT, CNT: ABNORMAL
COLONY COUNT, CNT: ABNORMAL
Lab: ABNORMAL

## 2025-07-17 NOTE — PROGRESS NOTES
Preventing Falls: Care Instructions  Injuries and health problems such as trouble walking or poor eyesight can increase your risk of falling. So can some medicines. But there are things you can do to help prevent falls. You can exercise to get stronger. You can also arrange your home to make it safer.    Talk to your doctor about the medicines you take. Ask if any of them increase the risk of falls and whether they can be changed or stopped.   Try to exercise regularly. It can help improve your strength and balance. This can help lower your risk of falling.         Practice fall safety and prevention.   Wear low-heeled shoes that fit well and give your feet good support. Talk to your doctor if you have foot problems that make this hard.  Carry a cellphone or wear a medical alert device that you can use to call for help.  Use stepladders instead of chairs to reach high objects. Don't climb if you're at risk for falls. Ask for help, if needed.  Wear the correct eyeglasses, if you need them.        Make your home safer.   Remove rugs, cords, clutter, and furniture from walkways.  Keep your house well lit. Use night-lights in hallways and bathrooms.  Install and use sturdy handrails on stairways.  Wear nonskid footwear, even inside. Don't walk barefoot or in socks without shoes.        Be safe outside.   Use handrails, curb cuts, and ramps whenever possible.  Keep your hands free by using a shoulder bag or backpack.  Try to walk in well-lit areas. Watch out for uneven ground, changes in pavement, and debris.  Be careful in the winter. Walk on the grass or gravel when sidewalks are slippery. Use de-icer on steps and walkways. Add non-slip devices to shoes.    Put grab bars and nonskid mats in your shower or tub and near the toilet. Try to use a shower chair or bath bench when bathing.   Get into a tub or shower by putting in your weaker leg first. Get out with your strong side first. Have a phone or medical alert 
GBS neg
Patient's repeat blood pressure 154/83. Discussed her baseline (tox) labs were normal from the last time her blood pressures were elevated. Patient states her blood pressure is always normal at other offices. We will continue to watch. Likely gestational hypertension. Patient is considering TOLAC. Reviewed risk of uterine rupture less than 1%. Discussed risk of still needing a repeat  section. We will schedule at next visit. She is to contact ID for up-to-date viral load as that will determine when she will be delivered. Check CBC and GBS. HIV viral load will be needed close to delivery date.
Opt out

## 2025-09-04 ENCOUNTER — HOSPITAL ENCOUNTER (EMERGENCY)
Facility: HOSPITAL | Age: 25
Discharge: HOME | End: 2025-09-05
Attending: EMERGENCY MEDICINE

## 2025-09-04 DIAGNOSIS — D50.9 MICROCYTIC ANEMIA: ICD-10-CM

## 2025-09-04 DIAGNOSIS — O46.90 VAGINAL BLEEDING IN PREGNANCY: Primary | ICD-10-CM

## 2025-09-04 LAB
APPEARANCE UR: CLEAR
BACTERIA URNS QL MICRO: NEGATIVE /HPF
BASOPHILS # BLD: 0.04 K/UL (ref 0–0.1)
BASOPHILS NFR BLD: 0.5 % (ref 0–1)
BILIRUB UR QL: NEGATIVE
COLOR UR: YELLOW
DIFFERENTIAL METHOD BLD: ABNORMAL
EOSINOPHIL # BLD: 0.35 K/UL (ref 0–0.4)
EOSINOPHIL NFR BLD: 4.2 % (ref 0–7)
EPITH CASTS URNS QL MICRO: ABNORMAL /LPF
ERYTHROCYTE [DISTWIDTH] IN BLOOD BY AUTOMATED COUNT: 18.6 % (ref 11.5–14.5)
GLUCOSE UR STRIP.AUTO-MCNC: NEGATIVE MG/DL
HCG UR QL: POSITIVE
HCT VFR BLD AUTO: 28.6 % (ref 35–47)
HGB BLD-MCNC: 8.5 G/DL (ref 11.5–16)
HGB UR QL STRIP: ABNORMAL
IMM GRANULOCYTES # BLD AUTO: 0.02 K/UL (ref 0–0.04)
IMM GRANULOCYTES NFR BLD AUTO: 0.2 % (ref 0–0.5)
KETONES UR QL STRIP.AUTO: NEGATIVE MG/DL
LEUKOCYTE ESTERASE UR QL STRIP.AUTO: NEGATIVE
LYMPHOCYTES # BLD: 2.21 K/UL (ref 0.8–3.5)
LYMPHOCYTES NFR BLD: 26.4 % (ref 12–49)
MCH RBC QN AUTO: 20.8 PG (ref 26–34)
MCHC RBC AUTO-ENTMCNC: 29.7 G/DL (ref 30–36.5)
MCV RBC AUTO: 70.1 FL (ref 80–99)
MONOCYTES # BLD: 0.59 K/UL (ref 0–1)
MONOCYTES NFR BLD: 7.1 % (ref 5–13)
NEUTS SEG # BLD: 5.15 K/UL (ref 1.8–8)
NEUTS SEG NFR BLD: 61.6 % (ref 32–75)
NITRITE UR QL STRIP.AUTO: NEGATIVE
PH UR STRIP: 5 (ref 5–8)
PLATELET # BLD AUTO: 413 K/UL (ref 150–400)
PMV BLD AUTO: 9.8 FL (ref 8.9–12.9)
PROT UR STRIP-MCNC: NEGATIVE MG/DL
RBC # BLD AUTO: 4.08 M/UL (ref 3.8–5.2)
RBC #/AREA URNS HPF: ABNORMAL /HPF (ref 0–5)
SP GR UR REFRACTOMETRY: 1.02 (ref 1–1.03)
UROBILINOGEN UR QL STRIP.AUTO: 0.1 EU/DL (ref 0.2–1)
WBC # BLD AUTO: 8.4 K/UL (ref 3.6–11)
WBC URNS QL MICRO: ABNORMAL /HPF (ref 0–4)

## 2025-09-04 PROCEDURE — 85025 COMPLETE CBC W/AUTO DIFF WBC: CPT

## 2025-09-04 PROCEDURE — 84702 CHORIONIC GONADOTROPIN TEST: CPT

## 2025-09-04 PROCEDURE — 81001 URINALYSIS AUTO W/SCOPE: CPT

## 2025-09-04 PROCEDURE — 80048 BASIC METABOLIC PNL TOTAL CA: CPT

## 2025-09-04 PROCEDURE — 81025 URINE PREGNANCY TEST: CPT

## 2025-09-04 RX ORDER — ALBUTEROL SULFATE 90 UG/1
2 INHALANT RESPIRATORY (INHALATION) EVERY 4 HOURS PRN
COMMUNITY
Start: 2025-08-26 | End: 2026-08-26

## 2025-09-04 RX ORDER — PRENATAL VIT/IRON FUM/FOLIC AC 27MG-0.8MG
1 TABLET ORAL DAILY
COMMUNITY
Start: 2025-08-27

## 2025-09-04 ASSESSMENT — PAIN SCALES - GENERAL
PAINLEVEL_OUTOF10: 0
PAINLEVEL_OUTOF10: 0

## 2025-09-04 ASSESSMENT — PAIN - FUNCTIONAL ASSESSMENT
PAIN_FUNCTIONAL_ASSESSMENT: 0-10
PAIN_FUNCTIONAL_ASSESSMENT: 0-10

## 2025-09-05 ENCOUNTER — APPOINTMENT (OUTPATIENT)
Facility: HOSPITAL | Age: 25
End: 2025-09-05

## 2025-09-05 VITALS
BODY MASS INDEX: 40.73 KG/M2 | TEMPERATURE: 98.2 F | DIASTOLIC BLOOD PRESSURE: 75 MMHG | HEART RATE: 87 BPM | RESPIRATION RATE: 18 BRPM | OXYGEN SATURATION: 98 % | HEIGHT: 69 IN | SYSTOLIC BLOOD PRESSURE: 135 MMHG | WEIGHT: 275 LBS

## 2025-09-05 LAB
ANION GAP SERPL CALC-SCNC: 11 MMOL/L (ref 2–12)
BUN SERPL-MCNC: 9 MG/DL (ref 6–20)
BUN/CREAT SERPL: 9 (ref 12–20)
CA-I BLD-MCNC: 8.8 MG/DL (ref 8.5–10.1)
CHLORIDE SERPL-SCNC: 103 MMOL/L (ref 97–108)
CO2 SERPL-SCNC: 24 MMOL/L (ref 21–32)
CREAT SERPL-MCNC: 1.05 MG/DL (ref 0.55–1.02)
GLUCOSE SERPL-MCNC: 93 MG/DL (ref 65–100)
HCG SERPL-ACNC: ABNORMAL MIU/ML (ref 0–6)
POTASSIUM SERPL-SCNC: 4 MMOL/L (ref 3.5–5.1)
SODIUM SERPL-SCNC: 138 MMOL/L (ref 136–145)

## 2025-09-05 PROCEDURE — 76817 TRANSVAGINAL US OBSTETRIC: CPT

## 2025-09-05 PROCEDURE — 76801 OB US < 14 WKS SINGLE FETUS: CPT

## 2025-09-05 ASSESSMENT — PAIN - FUNCTIONAL ASSESSMENT
PAIN_FUNCTIONAL_ASSESSMENT: 0-10
PAIN_FUNCTIONAL_ASSESSMENT: 0-10

## 2025-09-05 ASSESSMENT — PAIN SCALES - GENERAL
PAINLEVEL_OUTOF10: 0
PAINLEVEL_OUTOF10: 0

## (undated) DEVICE — STERILE POLYISOPRENE POWDER-FREE SURGICAL GLOVES WITH EMOLLIENT COATING: Brand: PROTEXIS

## (undated) DEVICE — C-SECTION: Brand: MEDLINE INDUSTRIES, INC.

## (undated) DEVICE — APPLICATOR SCRB 26ML TEAL STRL -- CHLORAPREP 26ML

## (undated) DEVICE — SUTURE ABSRB BRAID COAT UD CT NO 1 36IN VCRL J959H

## (undated) DEVICE — SUT VCRL + 2-0 36IN CT1 UD --

## (undated) DEVICE — TRAY URIN CATH PED 16FR BLLN 5CC INDWL STR TIP INF CTRL

## (undated) DEVICE — REM POLYHESIVE ADULT PATIENT RETURN ELECTRODE: Brand: VALLEYLAB

## (undated) DEVICE — SUT CHRMC 1 36IN CTX BRN --

## (undated) DEVICE — HANDLE SURG LIGHT OB

## (undated) DEVICE — BAG,SPONGE COUNTER,CLEAR,50/BX,5BX/CS: Brand: MEDLINE

## (undated) DEVICE — STAPLER SKIN SQ 30 ABSRB STPL DISP INSORB

## (undated) DEVICE — SUTURE PLN GUT SZ 2-0 L27IN ABSRB YELLOWISH TAN L70MM XLH 53T

## (undated) DEVICE — DRAPE C-SECTION W/WINDOW --

## (undated) DEVICE — ADHESIVE SKN CLSR HI VISC 2-O --

## (undated) DEVICE — SUTURE VCRL SZ 0 L36IN ABSRB UD L40MM CT 1/2 CIR TAPERPOINT J958H

## (undated) DEVICE — SOL IRR SOD CL 0.9% 1000ML BTL --